# Patient Record
Sex: MALE | Race: WHITE | Employment: UNEMPLOYED | URBAN - METROPOLITAN AREA
[De-identification: names, ages, dates, MRNs, and addresses within clinical notes are randomized per-mention and may not be internally consistent; named-entity substitution may affect disease eponyms.]

---

## 2019-02-13 ENCOUNTER — HOSPITAL ENCOUNTER (OUTPATIENT)
Dept: PHYSICAL THERAPY | Age: 39
Discharge: HOME | End: 2019-02-13
Payer: COMMERCIAL

## 2019-02-13 PROCEDURE — 97162 PT EVAL MOD COMPLEX 30 MIN: CPT

## 2019-02-13 PROCEDURE — 97110 THERAPEUTIC EXERCISES: CPT

## 2019-02-13 NOTE — PROGRESS NOTES
PT DAILY TREATMENT NOTE/KNEE EVAL 10-18 Patient Name: Ida Solomon Date:2019 : 1980 [x]  Patient  Verified Payor: BLUE CROSS / Plan: [facility] PPO / Product Type: PPO / In time:1030 Out time:1130 Total Treatment Time (min): 60 Visit #: 1 of 24 Medicare/BCBS Only Total Timed Codes (min):  40 1:1 Treatment Time:  60  
 
Treatment Area: Pain in right knee [M25.561] Bilateral leg weakness [R29.898] SUBJECTIVE Pain Level (0-10 scale): 2  
[]constant [x]intermittent [x]improving []worsening []no change since onset Any medication changes, allergies to medications, adverse drug reactions, diagnosis change, or new procedure performed?: [x] No    [] Yes (see summary sheet for update) Subjective functional status/changes:    
PLOF: difficulty with prolonged walking and standing, trouble performing household and community ADLs. Limitations to PLOF: continued difficulty with walking/standing, Trouble with community and household ADLs PMHx/Surgical Hx: stroke 3 years ago Work Hx: works  For [employer] Pt Goals: walk without cane OBJECTIVE/EXAMINATION 
  
 
20 min [x]Eval                  []Re-Eval  
 
 
40 min Therapeutic Exercise:  [x] See flow sheet :  
Rationale: increase strength and improve coordination to improve the patients ability to increase ease with community ambulation With 
 [] TE 
 [] TA 
 [] neuro 
 [] other: Patient Education: [x] Review HEP [] Progressed/Changed HEP based on:  
[] positioning   [] body mechanics   [] transfers   [] heat/ice application   
[] other:   
 
 
 
Physical Therapy Evaluation - Knee Posture: [] Varus    [] Valgus    [x] Recurvatum ( on left) [] Tibial Torsion    [] Foot Supination    [] Foot Pronation Describe: 
 
Gait:  [] Normal    [x] Abnormal    [] Antalgic    [] NWB    Device: single point cane   Describe:decreased knee flexion during swing phase on right, hyper extension on left knee during stand phase, poor toe off and limited DF on left. ROM / Strength 
[] Unable to assess                 Strength                    AROM                PROM Left Right Left Right Left Right Hip Flexion 2- 5  wnl wnl Extension 2- 4  wnl wnl Abduction 1 4  wnl wnl Adduction 1 4  wnl wnl Knee Flexion 2- 5  wnl wnl Extension 2- 3+  wnl wnl Ankle Plantarflexion 1 1  wnl wnl Dorsiflexion 2- 5  wnl wnl Flexibility: [] Unable to assess at this time Hamstrings: (L) Tightness= [x] WNL   [] Min   [x] Mod   [] Severe (R) Tightness= [] WNL   [] Min   [x] Mod   [] Severe Quadriceps: (L) Tightness= [] WNL   [] Min   [x] Mod   [] Severe (R) Tightness= [] WNL   [] Min   [x] Mod   [] Severe Gastroc: (L) Tightness= [] WNL   [] Min   [x] Mod   [] Severe (R) Tightness= [] WNL   [] Min   [x] Mod   [] Severe Other: 
 
Palpation: right knee Neg/Pos  Neg/Pos  Neg/Pos Joint Line pos Quad tendon pos Patellar ligament pos Patella pos Fibular head neg Pes Anserinus neg Tibial tubercle neg Hamstring tendons neg Infrapatellar fat pad neg Optional Tests: 
  
Patellar Mobility: WNL B Bed mobility: (I) Ambulation: MOD (I) Sit to stand: MOD (I) Sitting balance: good Standing balance: fair Stair navigation: requires use of hand rail and ambulates with step two gait pattern. Required CGA-MIN A Other tests/comments: 
Romberg stance:  Requires hand held support, weight shift over to right LE noted. Five time sit to stand: 24 seconds, requires cues of hands, poor eccentric quad control. Tinietti:  13, high risk for falls SLS: able to hold 30 seconds on right, able to hold 10 seconds on left with hand held support Pain Level (0-10 scale) post treatment: 2 
 
ASSESSMENT/Changes in Function: Pt is a 33 yo male with complaints of right knee pain and left LE weakness.   Pt notes he received arthroscopic surgery  8 months ago to remove a cyst on the right knee. Pt reports  history of a CVA 3 years ago which affected his left leg and was being treated in PT, however stopped due to moving to [facility] 4 months ago. Notes that his left LE started to bother him again following the surgery. Notes that his left leg has been feeling very stiff/weak lately, and thinks it is from compensating for his right leg. Pt denies no pain in left leg, just weakness. Right knee pain is intermittent, achy, dull, with episodes of sharp pain with general knee motions. Notes pain increases with walking, prolonged standing and with prolonged sitting. Pt notes pain is eased with rest and heat. Pt currently ambulates with a single point cane and uses it for both household and community ambulation. Notes he has been using his cane since he had the stroke 3 years ago. Pt notes that he feels like his left leg limits him more than his right due to the weakness. Pt notes he experiences occasional falls with the last fall occurring last week while playing with children. Pt presents with S/S consistent with impaired balance post CVA. Upon examination pt demonstrates with fair standing balance, poor dynamic balance, impaired gait mechanics, left LE weakness and has a high risk for falls evident by scoring a 13/28 on the tinetti balance test.  Due to impairments listed above pt is having difficulty performing household/community ADLS and notes difficulty playing with children. Patient will continue to benefit from skilled PT services to modify and progress therapeutic interventions, address functional mobility deficits, address ROM deficits, address strength deficits, analyze and address soft tissue restrictions, analyze and cue movement patterns, analyze and modify body mechanics/ergonomics, assess and modify postural abnormalities and instruct in home and community integration to attain remaining goals. [x]  See Plan of Care []  See progress note/recertification 
[]  See Discharge Summary Progress towards goals / Updated goals: 
Short term goals to be achieved in 1 week: 
1) Pt will report full compliance with HEP in order to progress in therapy. At [facility]: handed out HEP Long term goals to be achieved in 8 weeks 1) Pt will have an improved tinetti score to >/= 24 indicating a low risk for falls to increase ease with outdoor ambulation At [facility]: tinetti=13 
2) Pt will have an increase in the five time sit to stand test to >/=15 seconds without use of hands to increase ease with household ADLs. At [facility]:  Five time sit to stand: 24 seconds, requires cues of hands, poor eccentric quad control. 3) Pt will demonstrate an increase in SLS to >/=10 seconds on left to increase ease with stair management. At [facility]: able to hold 2 seconds with hand held support 4) Pt will have an improved FOTO to >/= 53 to increase ease with function At eval: FOTO= 42 
5) Pt will report an increase in function of >/=70% to increase ease with playing with children At eval: 0% PLAN [x]  Upgrade activities as tolerated     [x]  Continue plan of care 
[]  Update interventions per flow sheet      
[]  Discharge due to:_ 
[]  Other:Ton Ashraf 2/13/2019  10:29 AM

## 2019-02-13 NOTE — PROGRESS NOTES
[facility] [address] [facility], [address] [place]  
[phone] fax Plan of Care/ Statement of Necessity for Physical Therapy Services Patient name: Fan Reich Start of Care: 2019 Referral source: Alicia Funes DO : 1980 Medical Diagnosis: Pain in right knee [M25.561] Bilateral leg weakness [R29.898] Payor: BLUE CROSS / Plan: [facility] PPO / Product Type: PPO /  Onset Date:CVA 3 years ago, S/P of right knee: 8 months ago Treatment Diagnosis: LE weakness/impaired balance Prior Hospitalization: see medical history Provider#: [id number] Medications: Verified on Patient summary List  
 Comorbidities:  stroke 3 years ago Prior Level of Function: difficulty with prolonged walking and standing, trouble performing household and community ADLs The Plan of Care and following information is based on the information from the initial evaluation. Assessment/ key information:  
Pt is a 33 yo male with complaints of right knee pain and left LE weakness. Pt notes he received arthroscopic surgery  8 months ago to remove a cyst on the right knee. Pt reports  history of a CVA 3 years ago which affected his left leg and was being treated in PT, however stopped due to moving to [facility] 4 months ago. Notes that his left LE started to bother him again following the surgery. Notes that his left leg has been feeling very stiff/weak lately, and thinks it is from compensating for his right leg. Pt denies no pain in left leg, just weakness. Right knee pain is intermittent, achy, dull, with episodes of sharp pain with general knee motions. Notes pain increases with walking, prolonged standing and with prolonged sitting. Pt notes pain is eased with rest and heat.  Pt currently ambulates with a single point cane and uses it for both household and community ambulation. Notes he has been using his cane since he had the stroke 3 years ago. Pt notes that he feels like his left leg limits him more than his right due to the weakness. Pt notes he experiences occasional falls with the last fall occurring last week while playing with children. Pt presents with S/S consistent with impaired balance post CVA. Upon examination pt demonstrates with fair standing balance, poor dynamic balance, impaired gait mechanics, left LE weakness and has a high risk for falls evident by scoring a 13/28 on the tinetti balance test.  Due to impairments listed above pt is having difficulty performing household/community ADLS and notes difficulty playing with children. 
  
Patient will continue to benefit from skilled PT services to modify and progress therapeutic interventions, address functional mobility deficits, address ROM deficits, address strength deficits, analyze and address soft tissue restrictions, analyze and cue movement patterns, analyze and modify body mechanics/ergonomics, assess and modify postural abnormalities and instruct in home and community integration to attain remaining goals. Physical Therapy Evaluation - Knee 
  
Posture:         [] Varus    [] Valgus    [x] Recurvatum ( on left) [] Tibial Torsion    [] Foot Supination    [] Foot Pronation Describe: 
  
Gait:                [] Normal    [x] Abnormal    [] Antalgic    [] NWB    Device: single point cane Describe:decreased knee flexion during swing phase on right, hyper extension on left knee during stand phase, poor toe off and limited DF on left. 
  
ROM / Strength 
[] Unable to assess                 Strength                    AROM                PROM 
    Left Right Left Right Left Right Hip Flexion 2- 5   wnl wnl    
  Extension 2- 4   wnl wnl    
  Abduction 1 4   wnl wnl    
  Adduction 1 4   wnl wnl    
 Knee Flexion 2- 5   wnl wnl    
  Extension 2- 3+   wnl wnl    
Ankle Plantarflexion 1 1   wnl wnl    
  Dorsiflexion 2- 5   wnl wnl    
  
  
Flexibility: [] Unable to assess at this time Hamstrings: (L) Tightness= [x] WNL   [] Min   [x] Mod   [] Severe (R) Tightness= [] WNL   [] Min   [x] Mod   [] Severe Quadriceps: (L) Tightness= [] WNL   [] Min   [x] Mod   [] Severe (R) Tightness= [] WNL   [] Min   [x] Mod   [] Severe Gastroc: (L) Tightness= [] WNL   [] Min   [x] Mod   [] Severe (R) Tightness= [] WNL   [] Min   [x] Mod   [] Severe Other: 
  
Palpation: right knee 
  Neg/Pos   Neg/Pos   Neg/Pos Joint Line pos Quad tendon pos Patellar ligament pos Patella pos Fibular head neg Pes Anserinus neg Tibial tubercle neg Hamstring tendons neg Infrapatellar fat pad neg  
  
Optional Tests: 
  
Patellar Mobility: WNL B Bed mobility: (I) Ambulation: MOD (I) Sit to stand: MOD (I) Sitting balance: good Standing balance: fair Stair navigation: requires use of hand rail and ambulates with step two gait pattern. Required CGA-MIN A 
                  
  
Other tests/comments: 
Romberg stance:  Requires hand held support, weight shift over to right LE noted. Five time sit to stand: 24 seconds, requires cues of hands, poor eccentric quad control. Tinietti:  13, high risk for falls SLS: able to hold 30 seconds on right, able to hold 10 seconds on left with hand held support 
  
             
 
 
 
Evaluation Complexity History MEDIUM  Complexity : 1-2 comorbidities / personal factors will impact the outcome/ POC ; Examination MEDIUM Complexity : 3 Standardized tests and measures addressing body structure, function, activity limitation and / or participation in recreation  ;Presentation MEDIUM Complexity : Evolving with changing characteristics  ; Clinical Decision Making MEDIUM Complexity : FOTO score of 26-74 Overall Complexity Rating: MEDIUM Problem List: pain affecting function, decrease ROM, decrease strength, impaired gait/ balance, decrease ADL/ functional abilitiies, decrease activity tolerance, decrease flexibility/ joint mobility and decrease transfer abilities Treatment Plan may include any combination of the following: Therapeutic exercise, Therapeutic activities, Neuromuscular re-education, Physical agent/modality, Gait/balance training, Manual therapy, Patient education, Self Care training, Functional mobility training, Home safety training and Stair training Patient / Family readiness to learn indicated by: asking questions Persons(s) to be included in education: patient (P) Barriers to Learning/Limitations: None Patient Goal (s): be able to walk without cane Patient Self Reported Health Status: fair Rehabilitation Potential: good Progress towards goals / Updated goals: 
Short term goals to be achieved in 1 week: 
1) Pt will report full compliance with HEP in order to progress in therapy. 
 
  
Long term goals to be achieved in 8 weeks 1) Pt will have an improved tinetti score to >/= 24 indicating a low risk for falls to increase ease with outdoor ambulation 2) Pt will have an increase in the five time sit to stand test to >/=15 seconds without use of hands to increase ease with household ADLs. 3) Pt will demonstrate an increase in SLS to >/=10 seconds on left to increase ease with stair management. 4) Pt will have an improved FOTO to >/= 53 to increase ease with function 5) Pt will report an increase in function of >/=70% to increase ease with playing with children Frequency / Duration: Patient to be seen 3 times per week for 8 weeks. Patient/ Caregiver education and instruction: Diagnosis, prognosis, activity modification and exercises 
 [x]  Plan of care has been reviewed with GORDON Whitmore 2/13/2019 12:17 PM 
 ________________________________________________________________________ I certify that the above Therapy Services are being furnished while the patient is under my care. I agree with the treatment plan and certify that this therapy is necessary. [de-identified] Signature:____________Date:_________TIME:________ 
 
[employer], Date and Time must be completed for valid certification ** Please sign and return to [facility] [address] [facility] [address] [address] [place]  
[phone] fax

## 2019-02-15 ENCOUNTER — HOSPITAL ENCOUNTER (OUTPATIENT)
Dept: PHYSICAL THERAPY | Age: 39
Discharge: HOME | End: 2019-02-15
Payer: COMMERCIAL

## 2019-02-15 PROCEDURE — 97112 NEUROMUSCULAR REEDUCATION: CPT

## 2019-02-15 NOTE — PROGRESS NOTES
PT DAILY TREATMENT NOTE 10-18 Patient Name: Michiel Curling Date:2/15/2019 : 1980 [x]  Patient  Verified Payor: BLUE CROSS / Plan: [facility] PPO / Product Type: PPO / In time:1030 Out time: 1130 Total Treatment Time (min): 60 Visit #: 2 of 24 Medicare/BCBS Only Total Timed Codes (min):  60 1:1 Treatment Time:  30 Treatment Area: Pain in right knee [M25.561] Bilateral leg weakness [R29.898] SUBJECTIVE Pain Level (0-10 scale): 0 Any medication changes, allergies to medications, adverse drug reactions, diagnosis change, or new procedure performed?: [x] No    [] Yes (see summary sheet for update) Subjective functional status/changes:   [] No changes reported Pt notes initiation of HEP OBJECTIVE 30 min Therapeutic Exercise:  [x] See flow sheet :  
Rationale: increase ROM and increase strength to improve the patients ability to increase ease with ADLs 30 min Neuromuscular Re-education:  [x]  See flow sheet :gait training, standing balance exercises Rationale: increase strength, improve balance and increase proprioception  to improve the patients ability to increase ease with community ambulation With 
 [] TE 
 [] TA 
 [] neuro 
 [] other: Patient Education: [x] Review HEP [] Progressed/Changed HEP based on:  
[] positioning   [] body mechanics   [] transfers   [] heat/ice application   
[] other:   
 
  
 
Pain Level (0-10 scale) post treatment: 0 
 
ASSESSMENT/Changes in Function: Initiated exercises per POC. Pt was able to ambulate 20 feet with CGA-MIN A . Pt presents with weakness of left quad and dorsiflexors evident by ambulating with an extension biased gait pattern. Pt demonstrated with decreased foot clearance during swing phase and locks left knee out during single limb stance on left. Continue to progress gait training per pt tolerance.   
 
Patient will continue to benefit from skilled PT services to modify and progress therapeutic interventions, address functional mobility deficits, address ROM deficits, address strength deficits, analyze and address soft tissue restrictions, analyze and cue movement patterns, analyze and modify body mechanics/ergonomics, assess and modify postural abnormalities, address imbalance/dizziness and instruct in home and community integration to attain remaining goals. [x]  See Plan of Care 
[]  See progress note/recertification 
[]  See Discharge Summary Progress towards goals / Updated goals: 
Short term goals to be achieved in 1 week: 
1) Pt will report full compliance with HEP in order to progress in therapy. At [facility]: handed out HEP 
  
Long term goals to be achieved in 8 weeks 1) Pt will have an improved tinetti score to >/= 24 indicating a low risk for falls to increase ease with outdoor ambulation At [facility]: tinetti=13 
2) Pt will have an increase in the five time sit to stand test to >/=15 seconds without use of hands to increase ease with household ADLs. At [facility]:  Five time sit to stand: 24 seconds, requires cues of hands, poor eccentric quad control. 3) Pt will demonstrate an increase in SLS to >/=10 seconds on left to increase ease with stair management. At [facility]: able to hold 2 seconds with hand held support 4) Pt will have an improved FOTO to >/= 53 to increase ease with function At [facility]: FOTO= 42 
5) Pt will report an increase in function of >/=70% to increase ease with playing with children At [facility]: 0% 
  
 
 
 
PLAN [x]  Upgrade activities as tolerated     [x]  Continue plan of care 
[]  Update interventions per flow sheet      
[]  Discharge due to:_ 
[]  Other:_ Kit Loredo 2/15/2019  10:32 AM 
 
Future Appointments Date Time Provider Daron Rojas 2/19/2019  1:30 PM Katie Eastman [facility]  
2/21/2019  9:00 AM Afshin Ponce PTA [facility]  
2/25/2019  9:30 AM Afshin Ponce PTA [facility]  
 2/27/2019 11:30 AM [facility]ula Gram [facility]  
3/1/2019  9:00 AM [facility]ula Gram [facility]  
3/5/2019 11:30 AM [facility]ula Gram [facility]  
3/6/2019 10:30 AM Bula Gram [facility]  
3/8/2019  9:30 AM [facility]ula Gram [facility]  
3/11/2019 10:30 AM Bula Gram [facility]  
3/13/2019  1:00 PM Bula Gram [facility]  
3/15/2019  9:30 AM Bula Gram [facility]  
3/18/2019 10:30 AM Bula Gram [facility]  
3/20/2019 10:30 AM Bula Gram [facility]  
3/22/2019  9:00 AM Natasha Ashraf [facility]

## 2019-02-19 ENCOUNTER — HOSPITAL ENCOUNTER (OUTPATIENT)
Dept: PHYSICAL THERAPY | Age: 39
Discharge: HOME | End: 2019-02-19
Payer: COMMERCIAL

## 2019-02-19 PROCEDURE — 97110 THERAPEUTIC EXERCISES: CPT

## 2019-02-19 PROCEDURE — 97112 NEUROMUSCULAR REEDUCATION: CPT

## 2019-02-19 NOTE — PROGRESS NOTES
PT DAILY TREATMENT NOTE 10-18 Patient Name: Anam Grace Date:2019 : 1980 [x]  Patient  Verified Payor: BLUE CROSS / Plan: [facility] PPO / Product Type: PPO / In time:133  Out time:230 Total Treatment Time (min): 57 Visit #: 2 of 24 Medicare/BCBS Only Total Timed Codes (min):  57 1:1 Treatment Time:  62 Treatment Area: Pain in right knee [M25.561] Bilateral leg weakness [R29.898] SUBJECTIVE Pain Level (0-10 scale): 0 Any medication changes, allergies to medications, adverse drug reactions, diagnosis change, or new procedure performed?: [x] No    [] Yes (see summary sheet for update) Subjective functional status/changes:   [] No changes reported Pt notes mild soreness following last treatment session OBJECTIVE 20 min Therapeutic Exercise:  [x] See flow sheet :  
Rationale: increase ROM and increase strength to improve the patients ability to increase ease with ADls 37 min Neuromuscular Re-education:  [x]  See flow sheet :dynamic gait and balance exercises Rationale: increase strength, improve coordination and increase proprioception  to improve the patients ability to increase ease with community ambulation With 
 [] TE 
 [] TA 
 [] neuro 
 [] other: Patient Education: [x] Review HEP [] Progressed/Changed HEP based on:  
[] positioning   [] body mechanics   [] transfers   [] heat/ice application   
[] other:   
 
  
 
Pain Level (0-10 scale) post treatment: 0 
 
ASSESSMENT/Changes in Function: Pt presents with improved static balance. Progressed pt to Romberg stance on airex. Pt continues to require CGA-MIN A with ambulation without AD. Patient will continue to benefit from skilled PT services to modify and progress therapeutic interventions, address functional mobility deficits and address ROM deficits to attain remaining goals. [x]  See Plan of Care 
[]  See progress note/recertification 
[]  See Discharge Summary Progress towards goals / Updated goals: 
Short term goals to be achieved in 1 week: 
1) Pt will report full compliance with HEP in order to progress in therapy. At [facility]: handed out HEP Current: Met: reports full compliance with HEP. 2/19/19 
  
Long term goals to be achieved in 8 weeks 1) Pt will have an improved tinetti score to >/= 24 indicating a low risk for falls to increase ease with outdoor ambulation At [facility]: tinetti=13 
2) Pt will have an increase in the five time sit to stand test to >/=15 seconds without use of hands to increase ease with household ADLs. At [facility]:  Five time sit to stand: 24 seconds, requires cues of hands, poor eccentric quad control. 3) Pt will demonstrate an increase in SLS to >/=10 seconds on left to increase ease with stair management. At [facility]: able to hold 2 seconds with hand held support 4) Pt will have an improved FOTO to >/= 53 to increase ease with function At [facility]: FOTO= 42 
5) Pt will report an increase in function of >/=70% to increase ease with playing with children At [facility]: 0% PLAN [x]  Upgrade activities as tolerated     [x]  Continue plan of care 
[]  Update interventions per flow sheet      
[]  Discharge due to:_ 
[]  Other:_ Yeni Wray 2/19/2019  1:34 PM 
 
Future Appointments Date Time Provider Daron Rojas 2/21/2019  9:00 AM Gerry Prasad PTA [facility]  
2/25/2019  9:30 AM Gerry Prasad PTA [facility]  
2/27/2019 11:30 AM [facility]  
3/1/2019  9:00 AM [facility]en [facility]  
3/5/2019 11:30 AM [facility]en [facility]  
3/6/2019 10:30 AM [facility]  
3/8/2019  9:30 AM Kirsten Acuna [facility]  
3/11/2019 10:30 AM Kirsten Acuna [facility]  
3/13/2019  1:00 PM Kirsten Acuna [facility]  
3/15/2019  9:30 AM Kirsten Acuna [facility]  
3/18/2019 10:30 AM Kirsten Acuna [facility]  
3/20/2019 10:30 AM Kirsten Acuna [facility]  
3/22/2019  9:00 AM Natasha Ashraf [facility]

## 2019-02-21 ENCOUNTER — HOSPITAL ENCOUNTER (OUTPATIENT)
Dept: PHYSICAL THERAPY | Age: 39
Discharge: HOME | End: 2019-02-21
Payer: COMMERCIAL

## 2019-02-21 PROCEDURE — 97112 NEUROMUSCULAR REEDUCATION: CPT

## 2019-02-21 PROCEDURE — 97110 THERAPEUTIC EXERCISES: CPT

## 2019-02-21 NOTE — PROGRESS NOTES
PT DAILY TREATMENT NOTE 10-18 Patient Name: Fan Reich Date:2019 : 1980 [x]  Patient  Verified Payor: BLUE CROSS / Plan: [facility] PPO / Product Type: PPO / In time:9:02  Out time:10:04 Total Treatment Time (min): 62 Visit #: 4 of 24 Medicare/BCBS Only Total Timed Codes (min):  62 1:1 Treatment Time:  58 Treatment Area: Pain in right knee [M25.561] Bilateral leg weakness [R29.898] SUBJECTIVE Pain Level (0-10 scale): 0 Any medication changes, allergies to medications, adverse drug reactions, diagnosis change, or new procedure performed?: [x] No    [] Yes (see summary sheet for update) Subjective functional status/changes:   [] No changes reported Pt reports that he walks a little without his cane at home, but only while holding on to the walls. OBJECTIVE Min Type Additional Details  
 [] Estim:  []Unatt       []IFC  []Premod []Other:  []w/ice   []w/heat Position: Location:  
 [] Estim: []Att    []TENS instruct  []NMES []Other:  []w/US   []w/ice   []w/heat Position: Location:  
 []  Traction: [] Cervical       []Lumbar 
                     [] Prone          []Supine []Intermittent   []Continuous Lbs: 
[] before manual 
[] after manual  
 []  Ultrasound: []Continuous   [] Pulsed []1MHz   []3MHz W/cm2: 
Location:  
 []  Iontophoresis with dexamethasone Location: [] Take home patch  
[] In clinic  
 []  Ice     []  heat 
[]  Ice massage 
[]  Laser  
[]  Anodyne Position: Location:  
 []  Laser with stim 
[]  Other:  Position: Location:  
 []  Vasopneumatic Device Pressure:       [] lo [] med [] hi  
Temperature: [] lo [] med [] hi  
[] Skin assessment post-treatment:  []intact []redness- no adverse reaction 
  []redness  adverse reaction:  
 
 
39 min Therapeutic Exercise:  [x] See flow sheet :  
 Rationale: increase ROM and increase strength to improve the patients ability to increase ease with ADLs. 25 min Neuromuscular Re-education:  [x]  See flow sheet :balance and gait training. Rationale: increase ROM, increase strength, improve balance and increase proprioception  to improve the patients ability to increase tolerance to activities. With 
 [] TE 
 [] TA 
 [] neuro 
 [] other: Patient Education: [x] Review HEP [] Progressed/Changed HEP based on:  
[] positioning   [] body mechanics   [] transfers   [] heat/ice application   
[] other:   
 
Other Objective/Functional Measures:   
 
Pain Level (0-10 scale) post treatment: 0 
 
ASSESSMENT/Changes in Function: Pt ambulates with quad thrust on left LE and poor trunk stability. With standing marching on left LE pt compensates with Left QL to perform hip hikes. Pt has no quad set on left LE. Patient will continue to benefit from skilled PT services to modify and progress therapeutic interventions, address functional mobility deficits, address ROM deficits, address strength deficits and address imbalance/dizziness to attain remaining goals. []  See Plan of Care 
[]  See progress note/recertification 
[]  See Discharge Summary Progress towards goals / Updated goals: 
Short term goals to be achieved in 1 week: 
1) Pt will report full compliance with HEP in order to progress in therapy. At [facility]: handed out HEP Current: Met: reports full compliance with HEP. 2/19/19 
  
Long term goals to be achieved in 8 weeks 1) Pt will have an improved tinetti score to >/= 24 indicating a low risk for falls to increase ease with outdoor ambulation At [facility]: tinetti=13 
2) Pt will have an increase in the five time sit to stand test to >/=15 seconds without use of hands to increase ease with household ADLs. At [facility]:  Five time sit to stand: 24 seconds, requires cues of hands, poor eccentric quad control. Current: Not met: 5x sit to stand from 18\" chair with B UE use = 32 sec. 2/21/19 3) Pt will demonstrate an increase in SLS to >/=10 seconds on left to increase ease with stair management. At eval: able to hold 2 seconds with hand held support 4) Pt will have an improved FOTO to >/= 53 to increase ease with function At [facility]: FOTO= 42 
5) Pt will report an increase in function of >/=70% to increase ease with playing with children At eval: 0% 
  
  
 
 
 
PLAN 
[]  Upgrade activities as tolerated     [x]  Continue plan of care 
[]  Update interventions per flow sheet      
[]  Discharge due to:_ 
[]  Other:_ Tae Solis PTA 2/21/2019  9:06 AM 
 
Future Appointments Date Time Provider Daron Rojas 2/25/2019  9:30 AM Rusty Abreu PTA [facility]  
2/27/2019 11:30 AM Leanna Strong [facility]  
3/1/2019  9:00 AM Leanna Strong [facility]  
3/5/2019 11:30 AM Leanna Strong [facility]  
3/6/2019 10:30 AM Leanna Strong [facility]  
3/8/2019  9:30 AM Leanna Strong [facility]  
3/11/2019 10:30 AM Leanna Strong [facility]  
3/13/2019  1:00 PM Leanna Strong [facility]  
3/15/2019  9:30 AM Leanna Strong [facility]  
3/18/2019 10:30 AM Leanna Strong [facility]  
3/20/2019 10:30 AM Leanna Strong [facility]  
3/22/2019  9:00 AM Natasha Ashraf [facility]

## 2019-02-25 ENCOUNTER — HOSPITAL ENCOUNTER (OUTPATIENT)
Dept: PHYSICAL THERAPY | Age: 39
Discharge: HOME | End: 2019-02-25
Payer: COMMERCIAL

## 2019-02-25 PROCEDURE — 97110 THERAPEUTIC EXERCISES: CPT

## 2019-02-25 PROCEDURE — 97112 NEUROMUSCULAR REEDUCATION: CPT

## 2019-02-27 ENCOUNTER — HOSPITAL ENCOUNTER (OUTPATIENT)
Dept: PHYSICAL THERAPY | Age: 39
Discharge: HOME | End: 2019-02-27
Payer: COMMERCIAL

## 2019-02-27 PROCEDURE — 97112 NEUROMUSCULAR REEDUCATION: CPT

## 2019-02-27 PROCEDURE — 97110 THERAPEUTIC EXERCISES: CPT

## 2019-02-27 NOTE — PROGRESS NOTES
PT DAILY TREATMENT NOTE 10-18 Patient Name: Carmina Glasgow Date:2019 : 1980 [x]  Patient  Verified Payor: BLUE CROSS / Plan: [facility] PPO / Product Type: PPO / In time:1131  Out time: 1250 Total Treatment Time (min): 79 Visit #: 6 of 24 Medicare/BCBS Only Total Timed Codes (min):  71 1:1 Treatment Time:  30 Treatment Area: Pain in right knee [M25.561] Bilateral leg weakness [R29.898] SUBJECTIVE Pain Level (0-10 scale): 0 Any medication changes, allergies to medications, adverse drug reactions, diagnosis change, or new procedure performed?: [x] No    [] Yes (see summary sheet for update) Subjective functional status/changes:   [] No changes reported Pt notes improved balance and feels more steady when ambulating with in the home OBJECTIVE Modality rationale: decrease inflammation and decrease pain to improve the patients ability to increase ease with ADLS Min Type Additional Details  
 [] Estim:  []Unatt       []IFC  []Premod []Other:  []w/ice   []w/heat Position: Location:  
 [] Estim: []Att    []TENS instruct  []NMES []Other:  []w/US   []w/ice   []w/heat Position: Location:  
 []  Traction: [] Cervical       []Lumbar 
                     [] Prone          []Supine []Intermittent   []Continuous Lbs: 
[] before manual 
[] after manual  
 []  Ultrasound: []Continuous   [] Pulsed []1MHz   []3MHz W/cm2: 
Location:  
 []  Iontophoresis with dexamethasone Location: [] Take home patch  
[] In clinic  
10 [x]  Ice     []  heat 
[]  Ice massage 
[]  Laser  
[]  Anodyne Position:sitting Location:right knee  
 []  Laser with stim 
[]  Other:  Position: Location:  
 []  Vasopneumatic Device Pressure:       [] lo [] med [] hi  
Temperature: [] lo [] med [] hi  
[] Skin assessment post-treatment:  []intact []redness- no adverse reaction []redness  adverse reaction:  
 
 
31 min Therapeutic Exercise:  [x] See flow sheet :  
Rationale: increase ROM and increase strength to improve the patients ability to increase ease with self care tasks 40 min Neuromuscular Re-education:  [x]  See flow sheet :dynamic gait and balance exercises Rationale: increase strength, improve coordination, improve balance and increase proprioception  to improve the patients ability to increase ease with ambulation With 
 [] TE 
 [] TA 
 [] neuro 
 [] other: Patient Education: [x] Review HEP [] Progressed/Changed HEP based on:  
[] positioning   [] body mechanics   [] transfers   [] heat/ice application   
[] other:   
 
 
Pain Level (0-10 scale) post treatment: 0 
 
ASSESSMENT/Changes in Function: Pt continues to demonstrate improved balance with standing balance and dynamic gait exercises. Pt requires CGA with ambulation with MIN A while making turns. Patient will continue to benefit from skilled PT services to modify and progress therapeutic interventions, address functional mobility deficits, address ROM deficits, address strength deficits, analyze and address soft tissue restrictions, analyze and cue movement patterns, analyze and modify body mechanics/ergonomics, assess and modify postural abnormalities, address imbalance/dizziness and instruct in home and community integration to attain remaining goals. [x]  See Plan of Care 
[]  See progress note/recertification 
[]  See Discharge Summary Progress towards goals / Updated goals: 
Short term goals to be achieved in 1 week: 
1) Pt will report full compliance with HEP in order to progress in therapy. At [facility]: handed out HEP Current: Met: reports full compliance with HEP. 2/19/19 
  
Long term goals to be achieved in 8 weeks 1) Pt will have an improved tinetti score to >/= 24 indicating a low risk for falls to increase ease with outdoor ambulation At [facility]: tinetti=13 2) Pt will have an increase in the five time sit to stand test to >/=15 seconds without use of hands to increase ease with household ADLs. At [facility]:  Five time sit to stand: 24 seconds, requires cues of hands, poor eccentric quad control. Current: Not met: 5x sit to stand from 18\" chair with B UE use = 32 sec. 2/21/19 3) Pt will demonstrate an increase in SLS to >/=10 seconds on left to increase ease with stair management. At [facility]: able to hold 2 seconds with hand held support Current: Progressing: Pt was able to perform alternating marching without HHA. 2/25/18 4) Pt will have an improved FOTO to >/= 53 to increase ease with function At [facility]: FOTO= 42 
5) Pt will report an increase in function of >/=70% to increase ease with playing with children At [facility]: 0% 
 
  
  
 
PLAN [x]  Upgrade activities as tolerated     [x]  Continue plan of care 
[]  Update interventions per flow sheet      
[]  Discharge due to:_ 
[]  Other:_ Amrita Phillips 2/27/2019  11:36 AM 
 
Future Appointments Date Time Provider Daron Rojas 3/1/2019  9:00 AM Juli Henok [facility]  
3/5/2019 11:30 AM Juli Henok [facility]  
3/6/2019 10:30 AM Juli Henok [facility]  
3/8/2019  9:30 AM Juli Henok [facility]  
3/11/2019 10:30 AM Juli Henok [facility]  
3/13/2019  1:00 PM Juli Henok [facility]  
3/15/2019  9:30 AM Juli Henok [facility]  
3/18/2019 10:30 AM Juli Henok [facility]  
3/20/2019 10:30 AM Juli Sánchezke [facility]  
3/22/2019  9:00 AM Natasha Ashraf [facility]

## 2019-03-01 ENCOUNTER — HOSPITAL ENCOUNTER (OUTPATIENT)
Dept: PHYSICAL THERAPY | Age: 39
Discharge: HOME | End: 2019-03-01
Payer: COMMERCIAL

## 2019-03-01 PROCEDURE — 97112 NEUROMUSCULAR REEDUCATION: CPT

## 2019-03-05 ENCOUNTER — HOSPITAL ENCOUNTER (OUTPATIENT)
Dept: PHYSICAL THERAPY | Age: 39
Discharge: HOME | End: 2019-03-05
Payer: COMMERCIAL

## 2019-03-05 PROCEDURE — 97112 NEUROMUSCULAR REEDUCATION: CPT

## 2019-03-05 NOTE — PROGRESS NOTES
PT DAILY TREATMENT NOTE 10-18    Patient Name: Natalie Albright  Date:3/5/2019  : 1980  [x]  Patient  Verified  Payor: BLUE CROSS / Plan: [facility] PPO / Product Type: PPO /    In time:1128  Out time: 8439  Total Treatment Time (min): 52  Visit #: 8 of 24    Medicare/BCBS Only   Total Timed Codes (min):  52 1:1 Treatment Time:  30       Treatment Area: Pain in right knee [M25.561]  Bilateral leg weakness [R29.898]    SUBJECTIVE  Pain Level (0-10 scale): 0  Any medication changes, allergies to medications, adverse drug reactions, diagnosis change, or new procedure performed?: [x] No    [] Yes (see summary sheet for update)  Subjective functional status/changes:   [] No changes reported  Pt has no new complaints at this time. OBJECTIVE        25 min Therapeutic Exercise:  [x] See flow sheet :   Rationale: increase ROM and increase strength to improve the patients ability to increase ease with self care tasks     27 min Neuromuscular Re-education:  [x]  See flow sheet : dynamic gait and balance exercises    Rationale: improve coordination, improve balance and increase proprioception  to improve the patients ability to increase ease with community ambulation          With   [] TE   [] TA   [] neuro   [] other: Patient Education: [x] Review HEP    [] Progressed/Changed HEP based on:   [] positioning   [] body mechanics   [] transfers   [] heat/ice application    [] other:         Pain Level (0-10 scale) post treatment: 0    ASSESSMENT/Changes in Function: Pt was able to ambulate 120 feet with CGA. Pt continues to present with improved LE strength and balance. Initiate side stepping next visit.     Patient will continue to benefit from skilled PT services to modify and progress therapeutic interventions, address functional mobility deficits, address ROM deficits, address strength deficits, analyze and address soft tissue restrictions, analyze and cue movement patterns, analyze and modify body mechanics/ergonomics, assess and modify postural abnormalities, address imbalance/dizziness and instruct in home and community integration to attain remaining goals. [x]  See Plan of Care  []  See progress note/recertification  []  See Discharge Summary         Progress towards goals / Updated goals:  Short term goals to be achieved in 1 week:  1) Pt will report full compliance with HEP in order to progress in therapy. At [facility]: handed out HEP  Current: Met: reports full compliance with HEP. 2/19/19     Long term goals to be achieved in 8 weeks  1) Pt will have an improved tinetti score to >/= 24 indicating a low risk for falls to increase ease with outdoor ambulation  At [facility]: tinetti=13  2) Pt will have an increase in the five time sit to stand test to >/=15 seconds without use of hands to increase ease with household ADLs. At [facility]:  Five time sit to stand: 24 seconds, requires cues of hands, poor eccentric quad control. Current: Not met: 5x sit to stand from 18\" chair with B UE use = 32 sec. 2/21/19  3) Pt will demonstrate an increase in SLS to >/=10 seconds on left to increase ease with stair management. At [facility]: able to hold 2 seconds with hand held support   Current: Progressing: Pt was able to perform alternating marching without HHA.  2/25/18  4) Pt will have an improved FOTO to >/= 53 to increase ease with function  At [facility]: FOTO= 42  5) Pt will report an increase in function of >/=70% to increase ease with playing with children  At [facility]: 0%  Current; MET, pt reports 80% SOC. 3/5/19           PLAN  [x]  Upgrade activities as tolerated     [x]  Continue plan of care  []  Update interventions per flow sheet       []  Discharge due to:_  []  Other:_      Marclea Amtrev 3/5/2019  11:29 AM    Future Appointments   Date Time Provider Daron Rojas   3/5/2019 11:30 AM Sam Racer [facility]   3/6/2019 10:30 AM Sam Racer [facility]   3/8/2019  9:30 AM Sam Racer [facility]   3/11/2019 10:30 AM Blair Perkins [facility]   3/13/2019  1:00 PM Blair Perkins [facility]   3/15/2019  9:30 AM Blair Perkins [facility]   3/18/2019 10:30 AM Blair Perkins [facility]   3/20/2019 10:30 AM Blair Perkins [facility]   3/22/2019  9:00 AM Blair Perkins [facility]

## 2019-03-06 ENCOUNTER — APPOINTMENT (OUTPATIENT)
Dept: PHYSICAL THERAPY | Age: 39
End: 2019-03-06
Payer: COMMERCIAL

## 2019-03-08 ENCOUNTER — APPOINTMENT (OUTPATIENT)
Dept: PHYSICAL THERAPY | Age: 39
End: 2019-03-08
Payer: COMMERCIAL

## 2019-03-11 ENCOUNTER — HOSPITAL ENCOUNTER (OUTPATIENT)
Dept: PHYSICAL THERAPY | Age: 39
Discharge: HOME | End: 2019-03-11
Payer: COMMERCIAL

## 2019-03-11 PROCEDURE — 97112 NEUROMUSCULAR REEDUCATION: CPT

## 2019-03-11 PROCEDURE — 97110 THERAPEUTIC EXERCISES: CPT

## 2019-03-11 NOTE — PROGRESS NOTES
PT DAILY TREATMENT NOTE 10-18    Patient Name: Natalie Albright  Date:3/11/2019  : 1980  [x]  Patient  Verified  Payor: BLUE CROSS / Plan: Iv[employer] [id number] PPO / Product Type: PPO /    In time:1030  Out time:1130  Total Treatment Time (min): 60  Visit #: 9 of 14    Medicare/BCBS Only   Total Timed Codes (min):  60 1:1 Treatment Time:  60       Treatment Area: Pain in right knee [M25.561]  Bilateral leg weakness [R29.898]    SUBJECTIVE  Pain Level (0-10 scale): 0  Any medication changes, allergies to medications, adverse drug reactions, diagnosis change, or new procedure performed?: [x] No    [] Yes (see summary sheet for update)  Subjective functional status/changes:   [] No changes reported  Pt has no reports of pain today. Notes he feels safer when walking    OBJECTIVE      20 min Therapeutic Exercise:  [x] See flow sheet :review of pt's goals and FOTO   Rationale: increase ROM and increase strength to improve the patients ability to increase ease with self care tasks     40 min Neuromuscular Re-education:  [x]  See flow sheet : dynamic gait exercises, standing balance exercises, review of tinettie test   Rationale: increase strength, improve coordination, improve balance and increase proprioception  to improve the patients ability to increase ease with ambulatin           With   [] TE   [] TA   [] neuro   [] other: Patient Education: [x] Review HEP    [] Progressed/Changed HEP based on:   [] positioning   [] body mechanics   [] transfers   [] heat/ice application    [] other:           Pain Level (0-10 scale) post treatment: 0    ASSESSMENT/Changes in Function: Pt is progressing well towards long term goals.  Pt is able to ambulate longer distances with CGA, demonstrates with improved standing balance and had an improved score in the five time sit to stand and tinetti test. Pt continues to present with fair dynamic standing balance and continues to require MIN A when making turns during ambulation. Patient will continue to benefit from skilled PT services to modify and progress therapeutic interventions, address functional mobility deficits, address ROM deficits, address strength deficits, analyze and address soft tissue restrictions, analyze and cue movement patterns, analyze and modify body mechanics/ergonomics, assess and modify postural abnormalities, address imbalance/dizziness and instruct in home and community integration to attain remaining goals. [x]  See Plan of Care  []  See progress note/recertification  []  See Discharge Summary         Progress towards goals / Updated goals:  Short term goals to be achieved in 1 week:  1) Pt will report full compliance with HEP in order to progress in therapy. At [facility]: handed out HEP  Current: Met: reports full compliance with HEP. 2/19/19     Long term goals to be achieved in 8 weeks  1) Pt will have an improved tinetti score to >/= 24 indicating a low risk for falls to increase ease with outdoor ambulation  At [facility]: tinetti=13  Current:Progressing: tinetti=17 3/11/19  2) Pt will have an increase in the five time sit to stand test to >/=15 seconds without use of hands to increase ease with household ADLs. At [facility]:  Five time sit to stand: 24 seconds, requires cues of hands, poor eccentric quad control. Current: Progressing: five time sit to stand from 18'' table height, uses B hands = 20 seconds. 3/11/19  3) Pt will demonstrate an increase in SLS to >/=10 seconds on left to increase ease with stair management. At [facility]: able to hold 2 seconds with hand held support   Current: able to right side: 30 seconds, left side 3 seconds, able to march without [facility] support 3/11/19  4) Pt will have an improved FOTO to >/= 53 to increase ease with function  At [facility]: FOTO= 42  Current;FOTO= 53 3/11/19  5) Pt will report an increase in function of >/=70% to increase ease with playing with children  At [facility]: 0%  Current; MET, pt reports 80% SOC. 3/5/19    Updated goals  1) Pt will be able to ambulate up to 250 feet with SBA without cane to increase (I) with household ambulation  Current: 100 feet with CGA                        PLAN  [x]  Upgrade activities as tolerated     [x]  Continue plan of care  []  Update interventions per flow sheet       []  Discharge due to:_  []  Other:_      Radha Gene 3/11/2019  10:34 AM    Future Appointments   Date Time Provider Daron Rojas   3/14/2019 11:30 AM Bula Gram [facility]   3/15/2019  9:30 AM Bula Gram [facility]   3/18/2019 10:30 AM Bula Gram [facility]   3/20/2019 10:30 AM Bula Gram [facility]   3/22/2019  9:00 AM Bula Gram [facility]

## 2019-03-11 NOTE — PROGRESS NOTES
[facility]              [address]        [place], [address] [place]   [phone] fax    Progress Note  Patient name: Keith Lorenzo Start of Care: 2019   Referral source: Carmel Figueroa DO : 1980   Medical/Treatment Diagnosis: Pain in right knee [M25.561]  Bilateral leg weakness [R29.898]  Payor: BLUE CROSS / Plan: [facility] PPO / Product Type: PPO /  Onset Date:CVA 3 years ago, S/P of right knee: 8 months ago     Prior Hospitalization: see medical history Provider#: [id number]   Medications: Verified on Patient Summary List    Comorbidities:   stroke 3 years ago  Prior Level of Function:difficulty with prolonged walking and standing, trouble performing household and community ADLs  Visits from Start of Care: 9    Missed Visits: 0    Established Goals:          Short term goals to be achieved in 1 week:  1) Pt will report full compliance with HEP in order to progress in therapy. At eval: handed out HEP  Current: Met: reports full compliance with HEP.      Long term goals to be achieved in 8 weeks  1) Pt will have an improved tinetti score to >/= 24 indicating a low risk for falls to increase ease with outdoor ambulation  At eval: tinetti=13  Current:Progressing: tinetti=17   2) Pt will have an increase in the five time sit to stand test to >/=15 seconds without use of hands to increase ease with household ADLs. At eval:  Five time sit to stand: 24 seconds, requires cues of hands, poor eccentric quad control. Current: Progressing: five time sit to stand from 18'' table height, uses B hands = 20 seconds. 3) Pt will demonstrate an increase in SLS to >/=10 seconds on left to increase ease with stair management.   At eval: able to hold 2 seconds with hand held support   Current: able to right side: 30 seconds, left side 3 seconds, able to march without HH support   4) Pt will have an improved FOTO to >/= 53 to increase ease with function  At eval: FOTO= 42  Current;FOTO= 53   5) Pt will report an increase in function of >/=70% to increase ease with playing with children  At eval: 0%  Current; MET, pt reports 80% SOC. Key Functional Changes:   See above goals    Updated Goals: to be achieved in 4 weeks:  1) Pt will be able to ambulate up to 250 feet with SBA without cane to increase (I) with household ambulation  Current: 100 feet with CGA       ASSESSMENT/RECOMMENDATIONS:  [x]Continue therapy per initial plan/protocol at a frequency of  2  x per week for 4 weeks  []Continue therapy with the following recommended changes:_____________________      _____________________________________________________________________  []Discontinue therapy progressing towards or have reached established goals  []Discontinue therapy due to lack of appreciable progress towards goals  []Discontinue therapy due to lack of attendance or compliance  []Await Physician's recommendations/decisions regarding therapy  []Other:________________________________________________________________    Thank you for this referral.    Davy Libman 3/11/2019 11:12 AM  NOTE TO PHYSICIAN:  PLEASE COMPLETE THE ORDERS BELOW AND   FAX TO [facility]: [phone]  If you are unable to process this request in 24 hours please contact our office: [phone]    []  I have read the above report and request that my patient continue as recommended. []  I have read the above report and request that my patient continue therapy with the following changes/special instructions:________________________________________  []I have read the above report and request that my patient be discharged from therapy.     [de-identified] Signature:____________Date:_________TIME:________    [facility], Date and Time must be completed for valid certification **

## 2019-03-13 ENCOUNTER — APPOINTMENT (OUTPATIENT)
Dept: PHYSICAL THERAPY | Age: 39
End: 2019-03-13
Payer: COMMERCIAL

## 2019-03-14 ENCOUNTER — HOSPITAL ENCOUNTER (OUTPATIENT)
Dept: PHYSICAL THERAPY | Age: 39
Discharge: HOME | End: 2019-03-14
Payer: COMMERCIAL

## 2019-03-14 PROCEDURE — 97112 NEUROMUSCULAR REEDUCATION: CPT

## 2019-03-14 PROCEDURE — 97110 THERAPEUTIC EXERCISES: CPT

## 2019-03-14 NOTE — PROGRESS NOTES
PT DAILY TREATMENT NOTE 10-18    Patient Name: Fan Reich  Date:3/14/2019  : 1980  [x]  Patient  Verified  Payor: BLUE CROSS / Plan: [facility] PPO / Product Type: PPO /    In time:1133  Out time:1240  Total Treatment Time (min): 79  Visit #: 10 of 14    Medicare/BCBS Only   Total Timed Codes (min):  57 1:1 Treatment Time:  62       Treatment Area: Pain in right knee [M25.561]  Bilateral leg weakness [R29.898]    SUBJECTIVE  Pain Level (0-10 scale): 0  Any medication changes, allergies to medications, adverse drug reactions, diagnosis change, or new procedure performed?: [x] No    [] Yes (see summary sheet for update)  Subjective functional status/changes:   [] No changes reported  Pt has no new complaints at this time    OBJECTIVE    Modality rationale: decrease edema, decrease inflammation and decrease pain to improve the patients ability to increase ease with ADLs   Min Type Additional Details    [] Estim:  []Unatt       []IFC  []Premod                        []Other:  []w/ice   []w/heat  Position:  Location:    [] Estim: []Att    []TENS instruct  []NMES                    []Other:  []w/US   []w/ice   []w/heat  Position:  Location:    []  Traction: [] Cervical       []Lumbar                       [] Prone          []Supine                       []Intermittent   []Continuous Lbs:  [] before manual  [] after manual    []  Ultrasound: []Continuous   [] Pulsed                           []1MHz   []3MHz W/cm2:  Location:    []  Iontophoresis with dexamethasone         Location: [] Take home patch   [] In clinic   10 [x]  Ice     []  heat  []  Ice massage  []  Laser   []  Anodyne Position:supine  Location:B knees    []  Laser with stim  []  Other:  Position:  Location:    []  Vasopneumatic Device Pressure:       [] lo [] med [] hi   Temperature: [] lo [] med [] hi   [] Skin assessment post-treatment:  []intact []redness- no adverse reaction    []redness  adverse reaction:       20 min Therapeutic Exercise:  [x] See flow sheet :   Rationale: increase ROM and increase strength to improve the patients ability to increase ease with self care tasks     37 min Neuromuscular Re-education:  [x]  See flow sheet :dynamic gait exercises, standing balance exercises    Rationale: increase strength, improve coordination, improve balance and increase proprioception  to improve the patients ability to increase ease with community ambulation            With   [] TE   [] TA   [] neuro   [] other: Patient Education: [x] Review HEP    [] Progressed/Changed HEP based on:   [] positioning   [] body mechanics   [] transfers   [] heat/ice application    [] other:         Pain Level (0-10 scale) post treatment: 0    ASSESSMENT/Changes in Function:  Pt is able to ambulate 100 feet with SBA-CGA. Initiated side stepping and lateral step us today. Pt notes increased muscle fatigue following today's session. Patient will continue to benefit from skilled PT services to modify and progress therapeutic interventions, address functional mobility deficits, address ROM deficits, address strength deficits, analyze and address soft tissue restrictions, analyze and cue movement patterns, analyze and modify body mechanics/ergonomics, assess and modify postural abnormalities, address imbalance/dizziness and instruct in home and community integration to attain remaining goals. [x]  See Plan of Care  []  See progress note/recertification  []  See Discharge Summary         Progress towards goals / Updated goals:  Short term goals to be achieved in 1 week:  1) Pt will report full compliance with HEP in order to progress in therapy.   At [facility]: handed out HEP  Current: Met: reports full compliance with HEP. 2/19/19     Long term goals to be achieved in 8 weeks  1) Pt will have an improved tinetti score to >/= 24 indicating a low risk for falls to increase ease with outdoor ambulation  At [facility]: tinetti=13  Current:Progressing: tinetti=17 3/11/19  2) Pt will have an increase in the five time sit to stand test to >/=15 seconds without use of hands to increase ease with household ADLs. At [facility]:  Five time sit to stand: 24 seconds, requires cues of hands, poor eccentric quad control. Current: Progressing: five time sit to stand from 18'' table height, uses B hands = 20 seconds. 3/11/19  3) Pt will demonstrate an increase in SLS to >/=10 seconds on left to increase ease with stair management.   At [facility]: able to hold 2 seconds with hand held support   Current: able to right side: 30 seconds, left side 3 seconds, able to march without HH support 3/11/19  4) Pt will have an improved FOTO to >/= 53 to increase ease with function  At [facility]: FOTO= 42  Current;FOTO= 53 3/11/19  5) Pt will report an increase in function of >/=70% to increase ease with playing with children  At [facility]: 0%  Current; MET, pt reports 80% SOC. 3/5/19     Updated goals  1) Pt will be able to ambulate up to 250 feet with SBA without cane to increase (I) with household ambulation  Current: 100 feet with CGA      PLAN  [x]  Upgrade activities as tolerated     [x]  Continue plan of care  []  Update interventions per flow sheet       []  Discharge due to:_  []  Other:_      Ircky Yumi 3/14/2019  11:35 AM    Future Appointments   Date Time Provider Daron Rojas   3/15/2019  9:30 AM Terryann Pals [facility]   3/18/2019 10:30 AM [facility]   3/20/2019 10:30 AM Terryann Pals [facility]   3/22/2019  9:00 AM Terryann Pals [facility]

## 2019-03-15 ENCOUNTER — HOSPITAL ENCOUNTER (OUTPATIENT)
Dept: PHYSICAL THERAPY | Age: 39
Discharge: HOME | End: 2019-03-15
Payer: COMMERCIAL

## 2019-03-15 PROCEDURE — 97112 NEUROMUSCULAR REEDUCATION: CPT

## 2019-03-15 PROCEDURE — 97110 THERAPEUTIC EXERCISES: CPT

## 2019-03-15 NOTE — PROGRESS NOTES
PT DAILY TREATMENT NOTE 10-18    Patient Name: Ken Hernandez  Date:3/15/2019  : 1980  [x]  Patient  Verified  Payor: BLUE CROSS / Plan: Iván Todd [id number] PPO / Product Type: PPO /    In time:935  Out time:1140  Total Treatment Time (min): 72  Visit #: 1 of 8 (PN signed)    Medicare/BCBS Only   Total Timed Codes (min):  55 1:1 Treatment Time:  45       Treatment Area: Pain in right knee [M25.561]  Bilateral leg weakness [R29.898]    SUBJECTIVE  Pain Level (0-10 scale): 0 ( sore)  Any medication changes, allergies to medications, adverse drug reactions, diagnosis change, or new procedure performed?: [x] No    [] Yes (see summary sheet for update)  Subjective functional status/changes:   [] No changes reported  Pt notes he is sore following last visit.     OBJECTIVE    Modality rationale: decrease inflammation and decrease pain to improve the patients ability to increase ease with self care tasks   Min Type Additional Details    [] Estim:  []Unatt       []IFC  []Premod                        []Other:  []w/ice   []w/heat  Position:  Location:    [] Estim: []Att    []TENS instruct  []NMES                    []Other:  []w/US   []w/ice   []w/heat  Position:  Location:    []  Traction: [] Cervical       []Lumbar                       [] Prone          []Supine                       []Intermittent   []Continuous Lbs:  [] before manual  [] after manual    []  Ultrasound: []Continuous   [] Pulsed                           []1MHz   []3MHz W/cm2:  Location:    []  Iontophoresis with dexamethasone         Location: [] Take home patch   [] In clinic   10 [x]  Ice     []  heat  []  Ice massage  []  Laser   []  Anodyne Position:supine  Location:B knee    []  Laser with stim  []  Other:  Position:  Location:    []  Vasopneumatic Device Pressure:       [] lo [] med [] hi   Temperature: [] lo [] med [] hi   [] Skin assessment post-treatment:  []intact []redness- no adverse reaction    []redness  adverse reaction: 15 min Therapeutic Exercise:  [x] See flow sheet :   Rationale: increase ROM and increase strength to improve the patients ability to increase ease with ADls     40 min Neuromuscular Re-education:  [x]  See flow sheet :dynamic gait exercises and standing balance    Rationale: increase strength, improve coordination, improve balance and increase proprioception  to improve the patients ability to increase ease with community ambulation          With   [] TE   [] TA   [] neuro   [] other: Patient Education: [x] Review HEP    [] Progressed/Changed HEP based on:   [] positioning   [] body mechanics   [] transfers   [] heat/ice application    [] other:           Pain Level (0-10 scale) post treatment: 0    ASSESSMENT/Changes in Function: Pt was able to ambulate outdoors for 120 feet with CGA. Pt continues to presents with improved strength, balance and endurence. Patient will continue to benefit from skilled PT services to modify and progress therapeutic interventions, address functional mobility deficits, address ROM deficits, address strength deficits, analyze and address soft tissue restrictions, analyze and cue movement patterns, analyze and modify body mechanics/ergonomics, assess and modify postural abnormalities, address imbalance/dizziness and instruct in home and community integration to attain remaining goals. [x]  See Plan of Care  []  See progress note/recertification  []  See Discharge Summary         Progress towards goals / Updated goals:  Short term goals to be achieved in 1 week:  1) Pt will report full compliance with HEP in order to progress in therapy.   At [facility]: handed out HEP  Current: Met: reports full compliance with HEP. 2/19/19     Long term goals to be achieved in 8 weeks  1) Pt will have an improved tinetti score to >/= 24 indicating a low risk for falls to increase ease with outdoor ambulation  At [facility]: tinetti=13  Current:Progressing: tinetti=17 3/11/19  2) Pt will have an increase in the five time sit to stand test to >/=15 seconds without use of hands to increase ease with household ADLs. At [facility]:  Five time sit to stand: 24 seconds, requires cues of hands, poor eccentric quad control. Current: Progressing: five time sit to stand from 18'' table height, uses B hands = 20 seconds. 3/11/19  3) Pt will demonstrate an increase in SLS to >/=10 seconds on left to increase ease with stair management.   At [facility]: able to hold 2 seconds with hand held support   Current: able to right side: 30 seconds, left side 3 seconds, able to march without HH support 3/11/19  4) Pt will have an improved FOTO to >/= 53 to increase ease with function  At [facility]: FOTO= 42  Current;FOTO= 53 3/11/19  5) Pt will report an increase in function of >/=70% to increase ease with playing with children  At [facility]: 0%  Current; MET, pt reports 80% SOC. 3/5/19        PLAN  [x]  Upgrade activities as tolerated     [x]  Continue plan of care  []  Update interventions per flow sheet       []  Discharge due to:_  []  Other:_      Karen Talavera 3/15/2019  10:18 AM    Future Appointments   Date Time Provider Daron Rojas   3/18/2019 10:30 AM [facility]   3/20/2019 10:30 AM [facility]   3/22/2019  9:00 AM [facility]

## 2019-03-18 ENCOUNTER — HOSPITAL ENCOUNTER (OUTPATIENT)
Dept: PHYSICAL THERAPY | Age: 39
Discharge: HOME | End: 2019-03-18
Payer: COMMERCIAL

## 2019-03-18 PROCEDURE — 97112 NEUROMUSCULAR REEDUCATION: CPT

## 2019-03-18 PROCEDURE — 97110 THERAPEUTIC EXERCISES: CPT

## 2019-03-18 NOTE — PROGRESS NOTES
PT DAILY TREATMENT NOTE 10-18    Patient Name: Chayito Roel  Date:3/18/2019  : 1980  [x]  Patient  Verified  Payor: BLUE CROSS / Plan: [facility] PPO / Product Type: PPO /    In time:1030 Out time:1114  total Treatment Time (min): 40  Visit #: 2 of 8    Medicare/BCBS Only   Total Timed Codes (min):  44 1:1 Treatment Time:  38       Treatment Area: Pain in right knee [M25.561]  Bilateral leg weakness [R29.898]    SUBJECTIVE  Pain Level (0-10 scale):  0 (stiff)  Any medication changes, allergies to medications, adverse drug reactions, diagnosis change, or new procedure performed?: [x] No    [] Yes (see summary sheet for update)  Subjective functional status/changes:   [] No changes reported  Pt notes he was very sore following last visit. Notes the soreness is better today. OBJECTIVE    20 min Therapeutic Exercise:  [x] See flow sheet :   Rationale: increase ROM and increase strength to improve the patients ability to increase ease with ADls     24 min Neuromuscular Re-education:  [x]  See flow sheet : dynamic gait and balance exercises   Rationale: increase strength, improve coordination, improve balance and increase proprioception  to improve the patients ability to increase ease community ambulation        With   [] TE   [] TA   [] neuro   [] other: Patient Education: [x] Review HEP    [] Progressed/Changed HEP based on:   [] positioning   [] body mechanics   [] transfers   [] heat/ice application    [] other:           Pain Level (0-10 scale) post treatment: 0    ASSESSMENT/Changes in Function: Held on further progression due to pt continuing to be sore from last visit. Patient will continue to benefit from skilled PT services to modify and progress therapeutic interventions, address functional mobility deficits, address ROM deficits, address strength deficits, analyze and address soft tissue restrictions and analyze and cue movement patterns to attain remaining goals.      [x] See Plan of Care  []  See progress note/recertification  []  See Discharge Summary         Progress towards goals / Updated goals:  Short term goals to be achieved in 1 week:  1) Pt will report full compliance with HEP in order to progress in therapy. At eval: handed out HEP  Current: Met: reports full compliance with HEP. 2/19/19     Long term goals to be achieved in 8 weeks  1) Pt will have an improved tinetti score to >/= 24 indicating a low risk for falls to increase ease with outdoor ambulation  At eval: tinetti=13  Current:Progressing: tinetti=17 3/11/19  2) Pt will have an increase in the five time sit to stand test to >/=15 seconds without use of hands to increase ease with household ADLs. At [facility]:  Five time sit to stand: 24 seconds, requires cues of hands, poor eccentric quad control. Current: Progressing: five time sit to stand from 18'' table height, uses B hands = 20 seconds. 3/11/19  3) Pt will demonstrate an increase in SLS to >/=10 seconds on left to increase ease with stair management.   At [facility]: able to hold 2 seconds with hand held support   Current: able to right side: 30 seconds, left side 3 seconds, able to march without HH support 3/11/19  4) Pt will have an improved FOTO to >/= 53 to increase ease with function  At [facility]: FOTO= 42  Current;FOTO= 53 3/11/19  5) Pt will report an increase in function of >/=70% to increase ease with playing with children  At [facility]: 0%  Current; MET, pt reports 80% SOC. 3/18/19        PLAN  [x]  Upgrade activities as tolerated     [x]  Continue plan of care  []  Update interventions per flow sheet       []  Discharge due to:_  []  Other:_      Jeremy Harmon 3/18/2019  10:34 AM    Future Appointments   Date Time Provider Daron Rojas   3/20/2019 10:30 AM Татьяна Coreas [facility]   3/22/2019  9:00 AM Татьяна Coreas [facility]

## 2019-03-20 ENCOUNTER — APPOINTMENT (OUTPATIENT)
Dept: PHYSICAL THERAPY | Age: 39
End: 2019-03-20
Payer: COMMERCIAL

## 2019-03-22 ENCOUNTER — APPOINTMENT (OUTPATIENT)
Dept: PHYSICAL THERAPY | Age: 39
End: 2019-03-22
Payer: COMMERCIAL

## 2019-04-02 ENCOUNTER — HOSPITAL ENCOUNTER (OUTPATIENT)
Dept: PHYSICAL THERAPY | Age: 39
Discharge: HOME | End: 2019-04-02
Payer: COMMERCIAL

## 2019-04-02 PROCEDURE — 97112 NEUROMUSCULAR REEDUCATION: CPT

## 2019-04-02 PROCEDURE — 97110 THERAPEUTIC EXERCISES: CPT

## 2019-04-02 NOTE — PROGRESS NOTES
PT DAILY TREATMENT NOTE 10-18 Patient Name: Boyd Stockton Date:2019 : 1980 [x]  Patient  Verified Payor: BLUE CROSS / Plan: [facility] PPO / Product Type: PPO / In time:1030  Out time:1130 Total Treatment Time (min): 60 Visit #: 3 of 8 Medicare/BCBS Only Total Timed Codes (min):  60 1:1 Treatment Time:  60  
 
 
Treatment Area: Pain in right knee [M25.561] Bilateral leg weakness [R29.898] SUBJECTIVE Pain Level (0-10 scale): 0 Any medication changes, allergies to medications, adverse drug reactions, diagnosis change, or new procedure performed?: [x] No    [] Yes (see summary sheet for update) Subjective functional status/changes:   [] No changes reported Pt has no new complaints at this time. Notes he is tired from his trip. OBJECTIVE 20 min Therapeutic Exercise:  [x] See flow sheet :  
Rationale: increase ROM and increase strength to improve the patients ability to increase ease with ADls 40 min Neuromuscular Re-education:  [x]  See flow sheet : dynamic gait and balance exercises. Rationale: increase strength, improve coordination, improve balance and increase proprioception  to improve the patients ability to increase ease with community ambulation With 
 [] TE 
 [] TA 
 [] neuro 
 [] other: Patient Education: [x] Review HEP [] Progressed/Changed HEP based on:  
[] positioning   [] body mechanics   [] transfers   [] heat/ice application   
[] other:   
 
  
 
Pain Level (0-10 scale) post treatment: 0 
 
ASSESSMENT/Changes in Function: Held on progressing pt in dynamic gait or balance exercises due to fatigue and soreness.   
 
Patient will continue to benefit from skilled PT services to modify and progress therapeutic interventions, address functional mobility deficits, address ROM deficits, address strength deficits, analyze and address soft tissue restrictions, analyze and cue movement patterns, analyze and modify body mechanics/ergonomics, assess and modify postural abnormalities, address imbalance/dizziness and instruct in home and community integration to attain remaining goals. [x]  See Plan of Care 
[]  See progress note/recertification 
[]  See Discharge Summary Progress towards goals / Updated goals: 
Short term goals to be achieved in 1 week: 
1) Pt will report full compliance with HEP in order to progress in therapy. At [facility]: handed out HEP Current: Met: reports full compliance with HEP. 2/19/19 
  
Long term goals to be achieved in 8 weeks 1) Pt will have an improved tinetti score to >/= 24 indicating a low risk for falls to increase ease with outdoor ambulation At [facility]: tinetti=13 Current:Progressing: tinetti=17 3/11/19 
2) Pt will have an increase in the five time sit to stand test to >/=15 seconds without use of hands to increase ease with household ADLs. At [facility]:  Five time sit to stand: 24 seconds, requires cues of hands, poor eccentric quad control. Current: Progressing: five time sit to stand from 18'' table height, uses B hands = 20 seconds. 3/11/19 3) Pt will demonstrate an increase in SLS to >/=10 seconds on left to increase ease with stair management. At [facility]: able to hold 2 seconds with hand held support  
Current: able to right side: 30 seconds, left side 3 seconds, able to march without HH support 3/11/19 4) Pt will have an improved FOTO to >/= 53 to increase ease with function At [facility]: FOTO= 42 
Current;FOTO= 53 3/11/19 
5) Pt will report an increase in function of >/=70% to increase ease with playing with children At [facility]: 0% Current; MET, pt reports 80-85% SOC. 4/1/19 PLAN [x]  Upgrade activities as tolerated     [x]  Continue plan of care 
[]  Update interventions per flow sheet      
[]  Discharge due to:_ 
[]  Other:_ Andrew Eye 4/2/2019  10:27 AM 
 
Future Appointments Date Time Provider Daron Rojas 4/2/2019 10:30 AM Taina Portal [facility]  
 4/5/2019  9:00 AM [facility]  
4/9/2019 10:30 AM [facility]  
4/12/2019 10:00 AM [facility]  
4/16/2019 10:30 AM [facility]  
4/19/2019  9:30 AM [facility]  
4/23/2019 10:30 AM [facility]  
4/26/2019 10:30 AM [facility]  
4/30/2019 10:30 AM Natasha Ashraf [facility]  
5/3/2019 10:00 AM Natasha Ashraf [facility]

## 2019-04-05 ENCOUNTER — HOSPITAL ENCOUNTER (OUTPATIENT)
Dept: PHYSICAL THERAPY | Age: 39
Discharge: HOME | End: 2019-04-05
Payer: COMMERCIAL

## 2019-04-05 PROCEDURE — 97112 NEUROMUSCULAR REEDUCATION: CPT

## 2019-04-09 ENCOUNTER — HOSPITAL ENCOUNTER (OUTPATIENT)
Dept: PHYSICAL THERAPY | Age: 39
Discharge: HOME | End: 2019-04-09
Payer: COMMERCIAL

## 2019-04-09 PROCEDURE — 97110 THERAPEUTIC EXERCISES: CPT

## 2019-04-09 PROCEDURE — 97112 NEUROMUSCULAR REEDUCATION: CPT

## 2019-04-09 NOTE — PROGRESS NOTES
Malissa Mclean PT DAILY TREATMENT NOTE 10-18 Patient Name: Juanita Officer Date:2019 : 1980 [x]  Patient  Verified Payor: BLUE CROSS / Plan: [facility] PPO / Product Type: PPO / In PLFN:[id number] Out time:1123 Total Treatment Time (min): 50 Visit #: 5 of 8 Medicare/BCBS Only Total Timed Codes (min):  50 1:1 Treatment Time:  45 Treatment Area: Pain in right knee [M25.561] Bilateral leg weakness [R29.898] SUBJECTIVE Pain Level (0-10 scale): 0 Any medication changes, allergies to medications, adverse drug reactions, diagnosis change, or new procedure performed?: [x] No    [] Yes (see summary sheet for update) Subjective functional status/changes:   [] No changes reported Pt has no new complaints at this time OBJECTIVE 12 min Therapeutic Exercise:  [x] See flow sheet :  
Rationale: increase ROM and increase strength to improve the patients ability to increase ease with ADls 38 min Neuromuscular Re-education:  [x]  See flow sheet :dynamic gait and standing balance exercises , includes review of all goes and FOTO Rationale: increase strength, improve coordination, improve balance and increase proprioception  to improve the patients ability to improve ease with community ambulation With 
 [] TE 
 [] TA 
 [] neuro 
 [] other: Patient Education: [x] Review HEP [] Progressed/Changed HEP based on:  
[] positioning   [] body mechanics   [] transfers   [] heat/ice application   
[] other:   
 
  
 
Pain Level (0-10 scale) post treatment: 0 
 
ASSESSMENT/Changes in Function: Pt is progressing well towards long term goals. Pt demonstrated with increased LE strength, improved single leg balance, improved gait mechanics and had an improved score of the tinitetti and five time sit to stand. Pt continues to report difficulty with out door ambulation and stair navigation.  
 
Patient will continue to benefit from skilled PT services to modify and progress therapeutic interventions, address functional mobility deficits, address ROM deficits, address strength deficits, analyze and address soft tissue restrictions, analyze and cue movement patterns, analyze and modify body mechanics/ergonomics, assess and modify postural abnormalities and instruct in home and community integration to attain remaining goals. [x]  See Plan of Care 
[]  See progress note/recertification 
[]  See Discharge Summary Progress towards goals / Updated goals: 
 
Short term goals to be achieved in 1 week: 
1) Pt will report full compliance with HEP in order to progress in therapy. At eval: handed out HEP Current: Met: reports full compliance with HEP. 2/19/19 
  
Long term goals to be achieved in 8 weeks 1) Pt will have an improved tinetti score to >/= 24 indicating a low risk for falls to increase ease with outdoor ambulation Current:Progressing: tinetti=17 3/11/19 Current: Progressing: tinettie= 21 4/9/10 
2) Pt will have an increase in the five time sit to stand test to >/=15 seconds without use of hands to increase ease with household ADLs. Candance Huger At PN: Progressing: five time sit to stand from 18'' table height, uses B hands = 20 seconds. Current: Progressing five time sit to stands without use of hands 20 seconds. 4/9/19 
3) Pt will demonstrate an increase in SLS to >/=10 seconds on left to increase ease with stair management. At PN: able to right side: 30 seconds, left side 3 seconds, able to march without HH support 3/11/19 Current: Progressing: left SLS= 15 seconds 4/9/19 4) Pt will have an improved FOTO to >/= 53 to increase ease with function At PN: FOTO= 53 3/11/19 Current: FOTO= 49 4/9/10   
5) Pt will report an increase in function of >/=70% to increase ease with playing with children At eval: 0% At PM MET, pt reports 80-85% SOC. 4/1/19 Updated goals 1) Pt will be able to ambulate 200 feet outdoors with SBA-CGA to increase overall independence Current: CGA-MN A for 100 feet PLAN [x]  Upgrade activities as tolerated     [x]  Continue plan of care 
[]  Update interventions per flow sheet      
[]  Discharge due to:_ 
[]  Other:_ Kiran Grimaldo 4/9/2019  10:26 AM 
 
Future Appointments Date Time Provider Daron Rojas 4/9/2019 10:30 AM [facility]  
4/12/2019 10:00 AM [facility]  
[facility]  
[facility]19[facility]2019  [facility]  
4/23/2019 10:30 AM [facility]  
4/26/2019 10:30 AM [facility]  
4/30/2019 10:30 AM [facility]  
5/3/2019 10:00 AM Trae Mujica PTA [facility]

## 2019-04-12 ENCOUNTER — APPOINTMENT (OUTPATIENT)
Dept: PHYSICAL THERAPY | Age: 39
End: 2019-04-12
Payer: COMMERCIAL

## 2019-04-16 ENCOUNTER — HOSPITAL ENCOUNTER (OUTPATIENT)
Dept: PHYSICAL THERAPY | Age: 39
Discharge: HOME | End: 2019-04-16
Payer: COMMERCIAL

## 2019-04-16 PROCEDURE — 97112 NEUROMUSCULAR REEDUCATION: CPT

## 2019-04-16 NOTE — PROGRESS NOTES
PT DAILY TREATMENT NOTE 10-18 Patient Name: Maribeth Lynn Date:2019 : 1980 [x]  Patient  Verified Payor: BLUE CROSS / Plan: [facility] PPO / Product Type: PPO / In time:1030 Out time:1130 Total Treatment Time (min): 60 Visit #: 1 of 9 Medicare/BCBS Only Total Timed Codes (min):  60 1:1 Treatment Time:  45 Treatment Area: Pain in right knee [M25.561] Bilateral leg weakness [R29.898] SUBJECTIVE Pain Level (0-10 scale): 0 Any medication changes, allergies to medications, adverse drug reactions, diagnosis change, or new procedure performed?: [x] No    [] Yes (see summary sheet for update) Subjective functional status/changes:   [] No changes reported Pt notes that he is ambulating short distances within the home without his cane. OBJECTIVE 20 min Therapeutic Exercise:  [x] See flow sheet :  
Rationale: increase ROM and increase strength to improve the patients ability to improve ease with ADls 40 min Neuromuscular Re-education:  [x]  See flow sheet :dynamic gait and standing balance exercises. Rationale: increase strength, improve coordination, improve balance and increase proprioception  to improve the patients ability to improve ease with community mobility With 
 [] TE 
 [] TA 
 [] neuro 
 [] other: Patient Education: [x] Review HEP [] Progressed/Changed HEP based on:  
[] positioning   [] body mechanics   [] transfers   [] heat/ice application   
[] other:   
 
Other Objective/Functional Measures:  
 
Pain Level (0-10 scale) post treatment: 0 
 
ASSESSMENT/Changes in Function: Progressed pt in forward and lateral step overs. Pt is able to ambulate up to 240 feet with SBA without use of AD.  
 
Patient will continue to benefit from skilled PT services to modify and progress therapeutic interventions, address functional mobility deficits, address ROM deficits, address strength deficits, analyze and address soft tissue restrictions, analyze and cue movement patterns, analyze and modify body mechanics/ergonomics, assess and modify postural abnormalities, address imbalance/dizziness and instruct in home and community integration to attain remaining goals. []  See Plan of Care 
[]  See progress note/recertification 
[]  See Discharge Summary Progress towards goals / Updated goals: 
Short term goals to be achieved in 1 week: 
1) Pt will report full compliance with HEP in order to progress in therapy. At eval: handed out HEP Current: Met: reports full compliance with HEP. 2/19/19 
  
Long term goals to be achieved in 8 weeks 1) Pt will have an improved tinetti score to >/= 24 indicating a low risk for falls to increase ease with outdoor ambulation Current:Progressing: tinetti=17 3/11/19 Current: Progressing: tinettie= 21 4/9/10 
2) Pt will have an increase in the five time sit to stand test to >/=15 seconds without use of hands to increase ease with household ADLs. Carol Mclean At PN: Progressing: five time sit to stand from 18'' table height, uses B hands = 20 seconds. Current: Progressing five time sit to stands without use of hands 20 seconds. 4/9/19 
3) Pt will demonstrate an increase in SLS to >/=10 seconds on left to increase ease with stair management. At PN: able to right side: 30 seconds, left side 3 seconds, able to march without HH support 3/11/19 Current: Progressing: left SLS= 15 seconds 4/9/19 4) Pt will have an improved FOTO to >/= 53 to increase ease with function At PN: FOTO= 53 3/11/19 Current: FOTO= 49 4/9/10   
5) Pt will report an increase in function of >/=70% to increase ease with playing with children At eval: 0% At PM MET, pt reports 80-85% SOC. 4/1/19 
  
Updated goals 1) Pt will be able to ambulate 200 feet outdoors with SBA-CGA to increase overall independence Current: CGA-MN A for 100 feet PLAN [x]  Upgrade activities as tolerated     [x]  Continue plan of care []  Update interventions per flow sheet      
[]  Discharge due to:_ 
[]  Other:_ Krista Vincenta 4/16/2019  10:34 AM 
 
Future Appointments Date Time Provider Daron Rojas 4/19/2019  9:30 AM Driss Daunt [facility]  
4/23/2019 10:30 AM Driss Daunt [facility]  
4/26/2019 10:30 AM Driss Daunt [facility]  
4/30/2019 10:30 AM Driss Daunt [facility]  
5/3/2019 10:00 AM Yannick Beck PTA [facility]

## 2019-04-19 ENCOUNTER — HOSPITAL ENCOUNTER (OUTPATIENT)
Dept: PHYSICAL THERAPY | Age: 39
Discharge: HOME | End: 2019-04-19
Payer: COMMERCIAL

## 2019-04-19 PROCEDURE — 97112 NEUROMUSCULAR REEDUCATION: CPT

## 2019-04-19 NOTE — PROGRESS NOTES
PT DAILY TREATMENT NOTE 10-18 Patient Name: Amanda Marie Date:2019 : 1980 [x]  Patient  Verified Payor: BLUE CROSS / Plan: [facility] PPO / Product Type: PPO / In time:933 Out time:1025 Total Treatment Time (min): 52 Visit #: 2 of 9 Medicare/BCBS Only Total Timed Codes (min):  52 1:1 Treatment Time:  28 Treatment Area: Pain in right knee [M25.561] Bilateral leg weakness [R29.898] SUBJECTIVE Pain Level (0-10 scale): 0 Any medication changes, allergies to medications, adverse drug reactions, diagnosis change, or new procedure performed?: [x] No    [] Yes (see summary sheet for update) Subjective functional status/changes:   [] No changes reported Pt has no new complaints at this time OBJECTIVE 10 min Therapeutic Exercise:  [x] See flow sheet :  
Rationale: increase ROM and increase strength to improve the patients ability to increase ease with ADLs 42 min Neuromuscular Re-education:  [x]  See flow sheet :balance and gait exercises Rationale: improve coordination, improve balance and increase proprioception  to improve the patients ability to improve ease with community ambulation With 
 [] TE 
 [] TA 
 [] neuro 
 [] other: Patient Education: [x] Review HEP [] Progressed/Changed HEP based on:  
[] positioning   [] body mechanics   [] transfers   [] heat/ice application   
[] other:   
 
Other Objective/Functional Measures:   
 
Pain Level (0-10 scale) post treatment: 0 
 
ASSESSMENT/Changes in Function: Continue to progress pt in dynamic gait exercises Patient will continue to benefit from skilled PT services to modify and progress therapeutic interventions, address functional mobility deficits, address ROM deficits, address strength deficits, analyze and address soft tissue restrictions, analyze and cue movement patterns, analyze and modify body mechanics/ergonomics, assess and modify postural abnormalities, address imbalance/dizziness and instruct in home and community integration to attain remaining goals. [x]  See Plan of Care 
[]  See progress note/recertification 
[]  See Discharge Summary Progress towards goals / Updated goals: 
Short term goals to be achieved in 1 week: 
1) Pt will report full compliance with HEP in order to progress in therapy. At [facility]: handed out HEP Current: Met: reports full compliance with HEP. 2/19/19 
  
Long term goals to be achieved in 8 weeks 1) Pt will have an improved tinetti score to >/= 24 indicating a low risk for falls to increase ease with outdoor ambulation Current:Progressing: tinetti=17 3/11/19 Current: Progressing: tinettie= 21 4/9/10 
2) Pt will have an increase in the five time sit to stand test to >/=15 seconds without use of hands to increase ease with household ADLs. Reji Means At PN: Progressing: five time sit to stand from 18'' table height, uses B hands = 20 seconds.  
Current: Progressing five time sit to stands without use of hands 20 seconds. 4/9/19 
3) Pt will demonstrate an increase in SLS to >/=10 seconds on left to increase ease with stair management. At PN: able to right side: 30 seconds, left side 3 seconds, able to march without HH support 3/11/19 Current: Progressing: left SLS= 15 seconds 4/9/19 4) Pt will have an improved FOTO to >/= 53 to increase ease with function At PN: FOTO= 53 3/11/19 Current: FOTO= 49 4/9/10   
5) Pt will report an increase in function of >/=70% to increase ease with playing with children At eval: 0% At [facility], pt reports 80-85% SOC. 4/1/19 PLAN [x]  Upgrade activities as tolerated     [x]  Continue plan of care 
[]  Update interventions per flow sheet      
[]  Discharge due to:_ 
[]  Other:_ Ursula Sawyer 4/19/2019  9:35 AM 
 
Future Appointments Date Time Provider Daron Rojas 4/23/2019 10:30 [facility]  
4/26/2019 10:30 [facility]  
 4/30/2019 10:30 AM Jovanna Carrasco [facility]  
5/3/2019 10:00 [facility]

## 2019-04-23 ENCOUNTER — HOSPITAL ENCOUNTER (OUTPATIENT)
Dept: PHYSICAL THERAPY | Age: 39
Discharge: HOME | End: 2019-04-23
Payer: COMMERCIAL

## 2019-04-23 PROCEDURE — 97112 NEUROMUSCULAR REEDUCATION: CPT

## 2019-04-23 NOTE — PROGRESS NOTES
PT DAILY TREATMENT NOTE 10-18 Patient Name: Denis Page Date:2019 : 1980 [x]  Patient  Verified Payor: BLUE CROSS / Plan: [facility] PPO / Product Type: PPO / In PRMA:[id number]  Out time:1128 Total Treatment Time (min): 56 Visit #: 3 of 9 Medicare/BCBS Only Total Timed Codes (min):  56 1:1 Treatment Time:  45 Treatment Area: Pain in right knee [M25.561] Bilateral leg weakness [R29.898] SUBJECTIVE Pain Level (0-10 scale): 0 Any medication changes, allergies to medications, adverse drug reactions, diagnosis change, or new procedure performed?: [x] No    [] Yes (see summary sheet for update) Subjective functional status/changes:   [] No changes reported Pt has no new complaints at this time OBJECTIVE 16 min Therapeutic Exercise:  [x] See flow sheet :  
Rationale: increase ROM and increase strength to improve the patients ability to increase ease with ADLs 40 min Neuromuscular Re-education:  [x]  See flow sheet :dynamic gait and balance exercises Rationale: increase strength, improve coordination, improve balance and increase proprioception  to improve the patients ability to increase ease with community mobility With 
 [] TE 
 [] TA 
 [] neuro 
 [] other: Patient Education: [x] Review HEP [] Progressed/Changed HEP based on:  
[] positioning   [] body mechanics   [] transfers   [] heat/ice application   
[] other:   
 
  
 
Pain Level (0-10 scale) post treatment: 0 
 
ASSESSMENT/Changes in Function: Continue to progress pt in dynamic gait activities. Pt is able to ambulate 230 feet with SBA on even ground.  
 
Patient will continue to benefit from skilled PT services to modify and progress therapeutic interventions, address functional mobility deficits, address ROM deficits, address strength deficits, analyze and address soft tissue restrictions, analyze and cue movement patterns, analyze and modify body mechanics/ergonomics, assess and modify postural abnormalities, address imbalance/dizziness and instruct in home and community integration to attain remaining goals. [x]  See Plan of Care 
[]  See progress note/recertification 
[]  See Discharge Summary Progress towards goals / Updated goals: 
Short term goals to be achieved in 1 week: 
1) Pt will report full compliance with HEP in order to progress in therapy. At eval: handed out HEP Current: Met: reports full compliance with HEP. 2/19/19 
  
Long term goals to be achieved in 8 weeks 1) Pt will have an improved tinetti score to >/= 24 indicating a low risk for falls to increase ease with outdoor ambulation Current:Progressing: tinetti=17 3/11/19 Current: Progressing: tinettie= 21 4/9/10 
2) Pt will have an increase in the five time sit to stand test to >/=15 seconds without use of hands to increase ease with household ADLs. Braulio Bryan At PN: Progressing: five time sit to stand from 18'' table height, uses B hands = 20 seconds.  
Current: Progressing five time sit to stands without use of hands 20 seconds. 4/9/19 
3) Pt will demonstrate an increase in SLS to >/=10 seconds on left to increase ease with stair management. At PN: able to right side: 30 seconds, left side 3 seconds, able to march without HH support 3/11/19 Current: Progressing: left SLS= 15 seconds 4/9/19 4) Pt will have an improved FOTO to >/= 53 to increase ease with function At PN: FOTO= 53 3/11/19 Current: FOTO= 49 4/9/10   
5) Pt will report an increase in function of >/=70% to increase ease with playing with children At eval: 0% At [facility], pt reports 90% [facility] 4/23/19 PLAN [x]  Upgrade activities as tolerated     [x]  Continue plan of care 
[]  Update interventions per flow sheet      
[]  Discharge due to:_ 
[]  Other:_ Sarah Fine 4/23/2019  10:24 AM 
 
Future Appointments Date Time Provider Daron Rojas 4/23/2019 10:30 AM Lluvia Khan [facility]  
 4/26/2019 10:30 AM Corinna Dave [facility]  
4/30/2019 10:30 AM Corinna Dave [facility]  
5/3/2019 10:00 AM Caity Rogel PTA [facility]

## 2019-04-26 ENCOUNTER — HOSPITAL ENCOUNTER (OUTPATIENT)
Dept: PHYSICAL THERAPY | Age: 39
Discharge: HOME | End: 2019-04-26
Payer: COMMERCIAL

## 2019-04-26 PROCEDURE — 97112 NEUROMUSCULAR REEDUCATION: CPT

## 2019-04-26 PROCEDURE — 97110 THERAPEUTIC EXERCISES: CPT

## 2019-04-26 NOTE — PROGRESS NOTES
PT DAILY TREATMENT NOTE 10-18 Patient Name: Isaias Geller Date:2019 : 1980 [x]  Patient  Verified Payor: BLUE CROSS / Plan: [facility] PPO / Product Type: PPO / In time:1030  Out time:1120 Total Treatment Time (min): 50 Visit #: 4 of 9 Medicare/BCBS Only Total Timed Codes (min):  50 1:1 Treatment Time:  40 Treatment Area: Pain in right knee [M25.561] Bilateral leg weakness [R29.898] SUBJECTIVE Pain Level (0-10 scale): 0 Any medication changes, allergies to medications, adverse drug reactions, diagnosis change, or new procedure performed?: [x] No    [] Yes (see summary sheet for update) Subjective functional status/changes:   [] No changes reported Pt notes he continues to practice walking in the backyard for short distances without use of cane. OBJECTIVE 20 min Therapeutic Exercise:  [x] See flow sheet :  
Rationale: increase ROM and increase strength to improve the patients ability to improve ease with ADls 30 min Neuromuscular Re-education:  [x]  See flow sheet :standing balance and dynamic balance exercises Rationale: increase strength, improve coordination, improve balance and increase proprioception  to improve the patients ability to increase ease with outdoor and community ambulation With 
 [] TE 
 [] TA 
 [] neuro 
 [] other: Patient Education: [x] Review HEP [] Progressed/Changed HEP based on:  
[] positioning   [] body mechanics   [] transfers   [] heat/ice application   
[] other:   
  
 
Pain Level (0-10 scale) post treatment: 0 
 
ASSESSMENT/Changes in Function: Pt continues to demonstrate with improved static and dynamic balance. Pt continue to require SBA with ambulation without AD.  
 
Patient will continue to benefit from skilled PT services to modify and progress therapeutic interventions, address functional mobility deficits, address ROM deficits, address strength deficits, analyze and address soft tissue restrictions, analyze and cue movement patterns, analyze and modify body mechanics/ergonomics, assess and modify postural abnormalities, address imbalance/dizziness and instruct in home and community integration to attain remaining goals. [x]  See Plan of Care 
[]  See progress note/recertification 
[]  See Discharge Summary Progress towards goals / Updated goals: 
Short term goals to be achieved in 1 week: 
1) Pt will report full compliance with HEP in order to progress in therapy. At eval: handed out HEP Current: Met: reports full compliance with HEP. 2/19/19 
  
Long term goals to be achieved in 8 weeks 1) Pt will have an improved tinetti score to >/= 24 indicating a low risk for falls to increase ease with outdoor ambulation Current:Progressing: tinetti=17 3/11/19 Current: Progressing: tinettie= 21 4/9/10 
2) Pt will have an increase in the five time sit to stand test to >/=15 seconds without use of hands to increase ease with household ADLs. Shanita Narayan At PN: Progressing: five time sit to stand from 18'' table height, uses B hands = 20 seconds.  
Current: Progressing five time sit to stands without use of hands 20 seconds. 4/9/19 
3) Pt will demonstrate an increase in SLS to >/=10 seconds on left to increase ease with stair management. At PN: able to right side: 30 seconds, left side 3 seconds, able to march without HH support 3/11/19 Current: Progressing: left SLS= 15 seconds 4/9/19 4) Pt will have an improved FOTO to >/= 53 to increase ease with function At PN: FOTO= 53 3/11/19 Current: FOTO= 49 4/9/10   
5) Pt will report an increase in function of >/=70% to increase ease with playing with children At eval: 0% At [facility], pt reports 90% [facility] 4/23/19 
  
  
 
PLAN [x]  Upgrade activities as tolerated     [x]  Continue plan of care 
[]  Update interventions per flow sheet      
[]  Discharge due to:_ 
[]  Other:_ Alicia Gutiérrez 4/26/2019  10:24 AM 
 
Future Appointments Date Time Provider Daron Rojas 4/26/2019 10:30 AM Ines Balderas [facility]  
4/30/2019 10:30 AM Ines Balderas [facility]  
5/3/2019 10:00 AM Fayrene Lanes, PTA [facility]

## 2019-04-30 ENCOUNTER — HOSPITAL ENCOUNTER (OUTPATIENT)
Dept: PHYSICAL THERAPY | Age: 39
Discharge: HOME | End: 2019-04-30
Payer: COMMERCIAL

## 2019-04-30 PROCEDURE — 97112 NEUROMUSCULAR REEDUCATION: CPT

## 2019-04-30 NOTE — PROGRESS NOTES
PT DAILY TREATMENT NOTE 10-18 Patient Name: Karis Olea Date:2019 : 1980 [x]  Patient  Verified Payor: BLUE CROSS / Plan: [facility]PO / Product Type: PPO / In time:1030  Out time:1124 Total Treatment Time (min): 54 Visit #: 5 of 9 Medicare/BCBS Only Total Timed Codes (min):  54 1:1 Treatment Time:  45 Treatment Area: Pain in right knee [M25.561] Bilateral leg weakness [R29.898] SUBJECTIVE Pain Level (0-10 scale): 0 Any medication changes, allergies to medications, adverse drug reactions, diagnosis change, or new procedure performed?: [x] No    [] Yes (see summary sheet for update) Subjective functional status/changes:   [x] No changes reported New changes reported. States he has been walking more outdoors for short distances without cane. Notes he feels more confident with longer distances outdoor when using cane. OBJECTIVE 14 min Therapeutic Exercise:  [x] See flow sheet :  
Rationale: increase ROM and increase strength to improve the patients ability to increase ease with ADls 40 min Neuromuscular Re-education:  [x]  See flow sheet :dynamic gait and balance exercises Rationale: increase strength, improve coordination, improve balance and increase proprioception  to improve the patients ability to improve ease with household and community ADls With 
 [] TE 
 [] TA 
 [] neuro 
 [] other: Patient Education: [x] Review HEP [] Progressed/Changed HEP based on:  
[] positioning   [] body mechanics   [] transfers   [] heat/ice application   
[] other:   
 
 
 
Pain Level (0-10 scale) post treatment: 0 
 
ASSESSMENT/Changes in Function:  Pt is able to ambulate unlimited distances with cane independently. Pt is able to navigate over obstacles without use of cane and walk shorter distances without cane with SBA. Pt reports an overall improvement in functional mobility and (I) of 90% SOC. Plan to DC next week. Patient will continue to benefit from skilled PT services to modify and progress therapeutic interventions, address functional mobility deficits, address ROM deficits, address strength deficits, analyze and address soft tissue restrictions, analyze and cue movement patterns, analyze and modify body mechanics/ergonomics, assess and modify postural abnormalities, address imbalance/dizziness and instruct in home and community integration to attain remaining goals. [x]  See Plan of Care 
[]  See progress note/recertification 
[]  See Discharge Summary Progress towards goals / Updated goals: 
Short term goals to be achieved in 1 week: 
1) Pt will report full compliance with HEP in order to progress in therapy. At eval: handed out HEP Current: Met: reports full compliance with HEP. 2/19/19 
  
Long term goals to be achieved in 8 weeks 1) Pt will have an improved tinetti score to >/= 24 indicating a low risk for falls to increase ease with outdoor ambulation Current:Progressing: tinetti=17 3/11/19 Current: Progressing: tinettie= 21 4/9/10 
2) Pt will have an increase in the five time sit to stand test to >/=15 seconds without use of hands to increase ease with household ADLs. Ortiz Hodgson At PN: Progressing: five time sit to stand from 18'' table height, uses B hands = 20 seconds.  
Current: Progressing five time sit to stands without use of hands 20 seconds 4/30/19 3) Pt will demonstrate an increase in SLS to >/=10 seconds on left to increase ease with stair management. At PN: able to right side: 30 seconds, left side 3 seconds, able to march without HH support 3/11/19 Current: Progressing: left SLS= 23 seconds 4/30/19 4) Pt will have an improved FOTO to >/= 53 to increase ease with function At PN: FOTO= 53 3/11/19 Current: FOTO= 49 4/9/10   
5) Pt will report an increase in function of >/=70% to increase ease with playing with children At eval: 0% [facility], pt reports 90% [facility] 4/23/19 
  
 
PLAN 
 [x]  Upgrade activities as tolerated     [x]  Continue plan of care 
[]  Update interventions per flow sheet      
[]  Discharge due to:_ 
[]  Other:_ Tom Randhawa 4/30/2019  10:28 AM 
 
Future Appointments Date Time Provider Daron Rojas 4/30/2019 10:30 AM Brielle Mancini [facility]  
5/3/2019 10:00 AM Lupe Oseguera PTA [facility]

## 2019-05-03 ENCOUNTER — APPOINTMENT (OUTPATIENT)
Dept: PHYSICAL THERAPY | Age: 39
End: 2019-05-03
Payer: COMMERCIAL

## 2019-05-08 ENCOUNTER — HOSPITAL ENCOUNTER (OUTPATIENT)
Dept: PHYSICAL THERAPY | Age: 39
Discharge: HOME | End: 2019-05-08
Payer: COMMERCIAL

## 2019-05-08 PROCEDURE — 97110 THERAPEUTIC EXERCISES: CPT

## 2019-05-08 PROCEDURE — 97112 NEUROMUSCULAR REEDUCATION: CPT

## 2019-05-08 NOTE — PROGRESS NOTES
PT DAILY TREATMENT NOTE 10-18 Patient Name: Roverto Sevilla Date:2019 : 1980 [x]  Patient  Verified Payor: BLUE CROSS / Plan: [facility] PPO / Product Type: PPO / In time:1100  Out time: 2802 Total Treatment Time (min): 53 Visit #: 6 of 9 Medicare/BCBS Only Total Timed Codes (min):  53 1:1 Treatment Time:  45 Treatment Area: Pain in right knee [M25.561] Bilateral leg weakness [R29.898] SUBJECTIVE Pain Level (0-10 scale): 0 Any medication changes, allergies to medications, adverse drug reactions, diagnosis change, or new procedure performed?: [x] No    [] Yes (see summary sheet for update) Subjective functional status/changes:   [] No changes reported Pt has no new complaints at this time OBJECTIVE 20 min Therapeutic Exercise:  [x] See flow sheet :  
Rationale: increase ROM and increase strength to improve the patients ability to increase ease with community ADls 33 min Neuromuscular Re-education:  [x]  See flow sheet : dynamic gait activities standing balance activities Rationale: improve coordination, improve balance and increase proprioception  to improve the patients ability to increase ease with community ambulation With 
 [] TE 
 [] TA 
 [] neuro 
 [] other: Patient Education: [x] Review HEP [] Progressed/Changed HEP based on:  
[] positioning   [] body mechanics   [] transfers   [] heat/ice application   
[] other:   
 
Other Objective/Functional Measures:   
 
Pain Level (0-10 scale) post treatment: 0 
 
ASSESSMENT/Changes in Function: Pt is progressing well towards long term goals and reports increased (I) with community ambulation and will be DC next visit.  
 
Patient will continue to benefit from skilled PT services to modify and progress therapeutic interventions, address functional mobility deficits, address ROM deficits, address strength deficits, analyze and address soft tissue restrictions, analyze and cue movement patterns, analyze and modify body mechanics/ergonomics, assess and modify postural abnormalities, address imbalance/dizziness and instruct in home and community integration to attain remaining goals. [x]  See Plan of Care 
[]  See progress note/recertification 
[]  See Discharge Summary Progress towards goals / Updated goals: 
Short term goals to be achieved in 1 week: 
1) Pt will report full compliance with HEP in order to progress in therapy. At eval: handed out HEP Current: Met: reports full compliance with HEP. 2/19/19 
  
Long term goals to be achieved in 8 weeks 1) Pt will have an improved tinetti score to >/= 24 indicating a low risk for falls to increase ease with outdoor ambulation At PN[de-identified] Progressing: tinettie= 21 4/9/10 
2) Pt will have an increase in the five time sit to stand test to >/=15 seconds without use of hands to increase ease with household ADLs. .  
At PN: Progressing five time sit to stands without use of hands 20 seconds 4/30/19 3) Pt will demonstrate an increase in SLS to >/=10 seconds on left to increase ease with stair management. PN: Progressing: left SLS= 23 seconds 4/30/19 4) Pt will have an improved FOTO to >/= 53 to increase ease with function PN: FOTO= 49 4/9/10   
Current: MET: FOTO= 59  
5) Pt will report an increase in function of >/=70% to increase ease with playing with children At eval: 0% At [facility], pt reports 90% [facility] 4/23/19 PLAN [x]  Upgrade activities as tolerated     [x]  Continue plan of care 
[]  Update interventions per flow sheet      
[]  Discharge due to:_ 
[]  Other:_ Ursula Raw 5/8/2019  10:56 AM 
 
Future Appointments Date Time Provider Daron Rojas 5/8/2019 11:00 AM Sita Esparza [facility]  
5/10/2019  7:30 AM [facility]

## 2019-05-10 ENCOUNTER — HOSPITAL ENCOUNTER (OUTPATIENT)
Dept: PHYSICAL THERAPY | Age: 39
Discharge: HOME | End: 2019-05-10
Payer: COMMERCIAL

## 2019-05-10 PROCEDURE — 97112 NEUROMUSCULAR REEDUCATION: CPT

## 2019-05-10 PROCEDURE — 97110 THERAPEUTIC EXERCISES: CPT

## 2019-05-10 NOTE — PROGRESS NOTES
PT DAILY TREATMENT NOTE 10-18 Patient Name: Andi Duvall Date:5/10/2019 : 1980 [x]  Patient  Verified Payor: BLUE CROSS / Plan: [facility] PPO / Product Type: PPO / In time:734  Out time:824 Total Treatment Time (min): 50 Visit #: 7 of 9 Medicare/BCBS Only Total Timed Codes (min):  50 1:1 Treatment Time:  50 Treatment Area: Pain in right knee [M25.561] Bilateral leg weakness [R29.898] SUBJECTIVE Pain Level (0-10 scale): 0 Any medication changes, allergies to medications, adverse drug reactions, diagnosis change, or new procedure performed?: [x] No    [] Yes (see summary sheet for update) Subjective functional status/changes:   [] No changes reported Pt has no new complaints at this time. OBJECTIVE 20 min Therapeutic Exercise:  [x] See flow sheet :  
Rationale: increase ROM and increase strength to improve the patients ability to increase ease with ADLs 30 min Neuromuscular Re-education:  [x]  See flow sheet :dynamic balance and gait exercises Rationale: increase strength, improve coordination and increase proprioception  to improve the patients ability to improve ease with community mobility With 
 [] TE 
 [] TA 
 [] neuro 
 [] other: Patient Education: [x] Review HEP [] Progressed/Changed HEP based on:  
[] positioning   [] body mechanics   [] transfers   [] heat/ice application   
[] other:   
 
  
 
Pain Level (0-10 scale) post treatment: 0 
 
ASSESSMENT/Changes in Function: Pt is progressing well towards long term goals. Pt is able to ambulate unlimited distances with use of AD and is able to ambulate within the house and for short distances outdoors without AD with SBA. Pt reports an overall 90% improvement since [facility] and demonstrates with improved strength of left LE and increased balance. Pt is being D/C at this time due increased (I) with all household and communtiy ADLs. Patient will continue to benefit from skilled PT services to modify and progress therapeutic interventions, address functional mobility deficits, address ROM deficits, address strength deficits, analyze and address soft tissue restrictions, analyze and cue movement patterns, analyze and modify body mechanics/ergonomics, assess and modify postural abnormalities, address imbalance/dizziness and instruct in home and community integration to attain remaining goals. [x]  See Plan of Care 
[]  See progress note/recertification 
[]  See Discharge Summary Progress towards goals / Updated goals: 
Short term goals to be achieved in 1 week: 
1) Pt will report full compliance with HEP in order to progress in therapy. At eval: handed out HEP Current: Met: reports full compliance with HEP. 2/19/19 
  
Long term goals to be achieved in 8 weeks 1) Pt will have an improved tinetti score to >/= 24 indicating a low risk for falls to increase ease with outdoor ambulation At PN[de-identified] Progressing: tinettie= 21 4/9/10 Current Remains: remains 21 5/10/19 2) Pt will have an increase in the five time sit to stand test to >/=15 seconds without use of hands to increase ease with household ADLs. .  
At PN: Progressing five time sit to stands without use of hands 20 seconds 4/30/19 Current: MET, five time sit to stand=15 seconds without use of hands 5/10/19 3) Pt will demonstrate an increase in SLS to >/=10 seconds on left to increase ease with stair management. PN: Progressing: left SLS= 23 seconds 4/30/19 Current: MET, B SLS=30 seconds 5/10/19 4) Pt will have an improved FOTO to >/= 53 to increase ease with function PN: FOTO= 49 4/9/10   
Current: MET: FOTO= 59 5/8/19 
5) Pt will report an increase in function of >/=70% to increase ease with playing with children At eval: 0% At [facility], pt reports 90% [facility] 4/23/19 PLAN [x]  Upgrade activities as tolerated     [x]  Continue plan of care []  Update interventions per flow sheet      
[]  Discharge due to:_ 
[]  Other:_ Ursula Sawyer 5/10/2019  7:25 AM 
 
Future Appointments Date Time Provider Daron Rojas 5/10/2019  7:30 AM [facility]

## 2019-05-10 NOTE — PROGRESS NOTES
[facility] [address] [facility] [place] [place]  
[phone] fax Discharge Summary Patient name: Brian Beckford Start of Care: 2019 Referral source: Lorene MathurDO : 1980 Medical/Treatment Diagnosis: Pain in right knee [M25.561] Bilateral leg weakness [R29.898] Payor: BLUE CROSS / Plan: [facility] PPO / Product Type: PPO /  Onset Date:CVA 3 years ago, S/P of right knee: 8 months ago Prior Hospitalization: see medical history Provider#: [id number] Medications: Verified on Patient Summary List   
Comorbidities: CVA 3 years ago, S/P of right knee: 8 months ago Prior Level of Function::difficulty with prolonged walking and standing, trouble performing household and community ADLs Visits from Start of Care: 22    Missed Visits: 0 Reporting Period : 2019 to 5/10/2019 Summary of Care: 
Short term goals to be achieved in 1 week: 
1) Pt will report full compliance with HEP in order to progress in therapy. At eval: handed out HEP Current: Met: reports full compliance with HEP.  
  
Long term goals to be achieved in 8 weeks 1) Pt will have an improved tinetti score to >/= 24 indicating a low risk for falls to increase ease with outdoor ambulation At PN[de-identified] Progressing: tinettie= 21 Current Remains: remains 21 5 
2) Pt will have an increase in the five time sit to stand test to >/=15 seconds without use of hands to increase ease with household ADLs. .  
At PN: Progressing five time sit to stands without use of hands 20 seconds Current: MET, five time sit to stand=15 seconds without use of hands 3) Pt will demonstrate an increase in SLS to >/=10 seconds on left to increase ease with stair management. PN: Progressing: left SLS= 23 seconds Current: MET, B SLS=30 seconds 4) Pt will have an improved FOTO to >/= 53 to increase ease with function PN: FOTO= 49    
 Current: MET: FOTO= 59  
5) Pt will report an increase in function of >/=70% to increase ease with playing with children At eval: 0% At [facility], pt reports 90% SOC  
  
  
 
ASSESSMENT/RECOMMENDATIONS:Pt is progressing well towards long term goals. Pt is able to ambulate unlimited distances with use of AD and is able to ambulate within the house and for short distances outdoors without AD with SBA. Pt reports an overall 90% improvement since [facility] and demonstrates with improved strength of left LE and increased balance. Pt is being D/C at this time due increased (I) with all household and communtiy ADLs. [x]Discontinue therapy: [x]Patient has reached or is progressing toward set goals []Patient is non-compliant or has abdicated 
    []Due to lack of appreciable progress towards set goals eDlta Ashraf 5/10/2019 8:09 AM

## 2020-02-21 ENCOUNTER — APPOINTMENT (OUTPATIENT)
Dept: PHYSICAL THERAPY | Age: 40
End: 2020-02-21
Payer: COMMERCIAL

## 2020-02-24 ENCOUNTER — HOSPITAL ENCOUNTER (OUTPATIENT)
Dept: PHYSICAL THERAPY | Age: 40
Discharge: HOME | End: 2020-02-24
Payer: COMMERCIAL

## 2020-02-24 PROCEDURE — 97110 THERAPEUTIC EXERCISES: CPT

## 2020-02-24 PROCEDURE — 97162 PT EVAL MOD COMPLEX 30 MIN: CPT

## 2020-02-24 PROCEDURE — 97530 THERAPEUTIC ACTIVITIES: CPT

## 2020-02-24 NOTE — PROGRESS NOTES
PT DAILY TREATMENT NOTE 10-18    Patient Name: Ladonna Patient  Date:2020  : 1980  [x]  Patient  Verified  Payor: BLUE CROSS / Plan: Iván Todd [id number] PPO / Product Type: PPO /    In time:900  Out time:950  Total Treatment Time (min): 50  Visit #: 1 of 12    Medicare/BCBS Only   Total Timed Codes (min):  23 1:1 Treatment Time:  50       Treatment Area: Other abnormalities of gait and mobility [R26.89]  Weakness of left lower extremity [R29.898]    Physical Therapy Evaluation  Neurologic    SUBJECTIVE      Any medication changes, allergies to medications, adverse drug reactions, diagnosis change, or new procedure performed?: [x] No    [] Yes (see summary sheet for update)    Subjective functional status/changes:     PLOF: Father (10 yo, 1 yo), Fall History, Ambulation with SPC, Ambulation for Exercise  Current Functional Status: Difficulty with prolonged ambulation, Difficulty with functional lifting  Work 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 At this time patient reports subjectively 90% return of strength of the left UE/LE with greater functional limitations secondary to left LE weakness. Patient reports requirement to use [facility] with all ambulation with inability to ambulate without AD. Patient as well with history of right knee arthroscopic surgery 2018. Patient reports episodes of left LE muscular spasm.  Patient denies use of left LE bracing without use of AFO and knee bracing. Patient reports entire left LE N/T with prolonged sitting with patient denying N/T in left LE. Patient reports secondary to symptoms difficulty with household and functional ADLs. Patient with receival of PT services winter/spring 2019 with positive benefit. Patient reports falls occur when playing with children. Pain Intensity (0-10, VAS): Current 2, Worst 8, Best 0    Patient Goals: \"Be able to walk without the cane\"    OBJECTIVE EXAMINATION    BP: 112/82 mmHg     Gait: Without SPC; Right knee hyperextension during stance phase of gait with poor hip flexion during swing phase    Functional Tests:  1. TUG (10 feet, SPC): 16 sec  2. 30-Second-Sit-to-Stand (chair): 2 repetitions (min-Assist/CGA, no UE assist)  3.  Rhomberg: EO 30 sec / EC 30 sec    Shoulder Screen: AROM WNL and symmetrical; Strength WNL and symmetrical    Left LE ROM Screen: Hip/knee/ankle PROM WNL, (-) Modified Yann Test    Observation: Symmetrical muscle girth of thigh and lower leg    Strength (MMT):                      Hip L (1-5) R (1-5)   Hip Flexion 1 5   Hip Ext 1    Hip ER NT 5   Hip IR NT 5     Knee L (1-5) R (1-5)   Knee Flexion 1 5   Knee Extension 1 5   Ankle DF 2- 5     Tone: Normal tone    Reflexes: [] Not Tested   Left Right   Knee Jerk (L4) 2+ 2+   Ankle Jerk (SI) 0+ 2+     Balance/ Equilibrium:       Sitting Balance: Static:  [x] Good    [] Fair    [] Poor     Dynamic:   [x] Good    [] Fair    [] Poor    OBJECTIVE    27 min [x]Eval                  []Re-Eval     15 min Therapeutic Exercise:  [x] See flow sheet : Patient educated regarding completion of prescribed HEP and provided with written HEP instructions, Patient educated regarding diagnosis and PT POC   Rationale: increase ROM and increase strength to improve the patients ability to improve ease with functional ADLs    8 min Therapeutic Activity:  [x]  See flow sheet : Review of safety with reduce falls risk   Rationale: increase ROM, increase strength and increase proprioception  to improve the patients ability to decrease falls risk           With   [] TE   [] TA   [] neuro   [] other: Patient Education: [x] Review HEP    [] Progressed/Changed HEP based on:   [] positioning   [] body mechanics   [] transfers   [] heat/ice application    [] other:      Other Objective/Functional Measures: See objective above. Pain Level (0-10 scale) post treatment: 2    ASSESSMENT/Changes in Function: Patient presents with gait instability with PMH significant for stroke (~2015) with resultant left-sided weakness with requirement to utilize [facility] with ambulation. Patient noted with trace muscle contractions of the left LE with a compensatory gait pattern with SPC with left knee hyperextension noted and poor limb progression with ambulation without SPC. To promote improvement in left LE muscular recruitment and gait sequencing to improve static and dynamic stability and reduce falls risk with patient with goal to ambulate without AD. Patient will continue to benefit from skilled PT services to address strength deficits, analyze and address soft tissue restrictions, analyze and cue movement patterns, analyze and modify body mechanics/ergonomics, assess and modify postural abnormalities, address imbalance/dizziness and instruct in home and community integration to attain remaining goals. [x]  See Plan of Care  []  See progress note/recertification  []  See Discharge Summary         Progress towards goals / Updated goals:    Short Term Goals: To be accomplished in 3 weeks:  1. Patient will subjectively report full compliance with prescribed HEP. Eval: HEP provided  2. Patient will demonstrate supine left hip flexion with stability ball 0-90 degrees to improve ease with curb management. Eval: Supine Hip Flexion with Stability = Inability to perform without manual assistanceb  3.  Patient will demonstrate ability to perform rhomberg (EO, airex) >/= 30 seconds to improve ease with . Eval: Rhomberg (EO, no-Airex) = 30 sec    Long Term Goals: To be accomplished in 6 weeks:  1. Patient will demonstrate a significant functional improvement as demonstrated by a score of >/= 61 on FOTO. Eval: FOTO = 44       2. Patient will demonstrate TUG (10 feet, no AD) </= 20 seconds to demonstrate a reduced falls risk. Eval: TUG (10 feet, SPC = 16 sec  3. Patient will demonstrate 30-second-sit-to-stand >/= 5 repetitions (no UE assist, CGA) to improve ease with functional transfers.   Eval: 30-Second-Sit-to-Stand (chair): 2 repetitions (min-Assist/CGA, no UE assist)      PLAN  [x]  Upgrade activities as tolerated     []  Continue plan of care  []  Update interventions per flow sheet       []  Discharge due to:_  []  Other:_      Naila Osei PT 2/24/2020  6:49 AM    Future Appointments   Date Time Provider Daron Rojas   2/24/2020  9:00 AM Nhi Arguello, PT [facility]

## 2020-02-24 NOTE — PROGRESS NOTES
[facility]              [address]        [place], [address]  [phone] fax    Plan of Care/ Statement of Necessity for Physical Therapy Services  Patient name: Ricki Swartz Start of Care: 2020   Referral source: Quintin Patel MD : 1980    Medical Diagnosis: Other abnormalities of gait and mobility [R26.89]  Weakness of left lower extremity [R29.898]  Payor: BLUE CROSS / Plan: Peterinta DANA Todd [id number] PPO / Product Type: PPO /  Onset Date:~5 years prior to IE    Treatment Diagnosis: Gait Instability    Prior Hospitalization: see medical history Provider#: [id number]   Medications: Verified on Patient summary List    Comorbidities: Stroke (~5 years prior to IE - left UE/LE involvement), Right Knee Arthroscopic Surgery (2018 - Meniscus Repair, Cyst Removal), Right Wrist Surgery (Scapho-lunate dissociation - ~10 years ago; Hardware removal), HA, Back Pain   Prior Level of Function: Father (10 yo, 1 yo), Fall History, Ambulation with SPC, Ambulation for Exercise     The Plan of Care and following information is based on the information from the initial evaluation. Assessment:    Patient presents with gait instability with PMH significant for stroke (~) with resultant left-sided weakness with requirement to utilize [facility] with ambulation. Patient noted with trace muscle contractions of the left LE with a compensatory gait pattern with SPC with left knee hyperextension noted and poor limb progression with ambulation without SPC.  To promote improvement in left LE muscular recruitment and gait sequencing to improve static and dynamic stability and reduce falls risk with patient with goal to ambulate without AD.      Patient will continue to benefit from skilled PT services to address strength deficits, analyze and address soft tissue restrictions, analyze and cue movement patterns, analyze and modify body mechanics/ergonomics, assess and modify postural abnormalities, address imbalance/dizziness and instruct in home and community integration to attain remaining goals. Key Information:    Gait: Without SPC; Right knee hyperextension during stance phase of gait with poor hip flexion during swing phase     Functional Tests:  1. TUG (10 feet, SPC): 16 sec  2. 30-Second-Sit-to-Stand (chair): 2 repetitions (min-Assist/CGA, no UE assist)  3. Rhomberg: EO 30 sec / EC 30 sec      Left LE ROM Screen: Hip/knee/ankle PROM WNL, (-) Modified Yann Test    Strength (MMT):                      Hip L (1-5) R (1-5)   Hip Flexion 1 5   Hip Ext NT NT    Hip ER NT 5   Hip IR NT 5      Knee L (1-5) R (1-5)   Knee Flexion 1 5   Knee Extension 1 5   Ankle DF 2- 5     Evaluation Complexity History MEDIUM  Complexity : 1-2 comorbidities / personal factors will impact the outcome/ POC ; Examination MEDIUM Complexity : 3 Standardized tests and measures addressing body structure, function, activity limitation and / or participation in recreation  ;Presentation MEDIUM Complexity : Evolving with changing characteristics  ; Clinical Decision Making MEDIUM Complexity : FOTO score of 26-74  Overall Complexity Rating: MEDIUM  Problem List: pain affecting function, decrease ROM, decrease strength, impaired gait/ balance, decrease ADL/ functional abilitiies, decrease activity tolerance, decrease flexibility/ joint mobility and decrease transfer abilities   Treatment Plan may include any combination of the following: Therapeutic exercise, Therapeutic activities, Neuromuscular re-education, Physical agent/modality, Gait/balance training, Manual therapy, Patient education, Self Care training, Functional mobility training, Home safety training and Stair training  Patient / Family readiness to learn indicated by: asking questions, trying to perform skills and interest  Persons(s) to be included in education: patient (P)  Barriers to Learning/Limitations: None  Patient Goal (s): Be able to walk without the cane\"  Patient Self Reported Health Status: good  Rehabilitation Potential: good    Short Term Goals: To be accomplished in 3 weeks:  1. Patient will subjectively report full compliance with prescribed HEP. Eval: HEP provided  2. Patient will demonstrate supine left hip flexion with stability ball 0-90 degrees to improve ease with curb management. Eval: Supine Hip Flexion with Stability = Inability to perform without manual assistanceb  3. Patient will demonstrate ability to perform rhomberg (EO, airex) >/= 30 seconds to improve ease with . Eval: Rhomberg (EO, no-Airex) = 30 sec    Long Term Goals: To be accomplished in 6 weeks:  1. Patient will demonstrate a significant functional improvement as demonstrated by a score of >/= 61 on FOTO. Eval: FOTO = 44       2. Patient will demonstrate TUG (10 feet, no AD) </= 20 seconds to demonstrate a reduced falls risk. Eval: TUG (10 feet, SPC = 16 sec  3. Patient will demonstrate 30-second-sit-to-stand >/= 5 repetitions (no UE assist, CGA) to improve ease with functional transfers. Eval: 30-Second-Sit-to-Stand (chair): 2 repetitions (min-Assist/CGA, no UE assist)    Frequency / Duration: Patient to be seen 2 times per week for 6 weeks. Patient/ Caregiver education and instruction: Diagnosis, prognosis, self care, activity modification and exercises   [x]  Plan of care has been reviewed with PTA    Sony Kidd, PT 2/24/2020 6:52 AM  ________________________________________________________________________    I certify that the above Therapy Services are being furnished while the patient is under my care. I agree with the treatment plan and certify that this therapy is necessary.     Physician's Signature:____________Date:_________TIME:________    [employer], Date and Time must be completed for valid certification **  Please sign and return to [facility] [address] [facility] [address], [address]   [phone] fax

## 2020-03-03 ENCOUNTER — HOSPITAL ENCOUNTER (OUTPATIENT)
Dept: PHYSICAL THERAPY | Age: 40
Discharge: HOME | End: 2020-03-03
Payer: COMMERCIAL

## 2020-03-03 PROCEDURE — 97110 THERAPEUTIC EXERCISES: CPT

## 2020-03-03 PROCEDURE — 97112 NEUROMUSCULAR REEDUCATION: CPT

## 2020-03-03 NOTE — PROGRESS NOTES
PT DAILY TREATMENT NOTE 10-18    Patient Name: Karis Olea  Date:3/3/2020  : 1980  [x]  Patient  Verified  Payor: Shanna Alfaro / Plan: Iván DANA Todd [id number] PPO / Product Type: PPO /    In time:834  Out time:917  Total Treatment Time (min): 43  Visit #: 2 of 12    Medicare/BCBS Only   Total Timed Codes (min):  43 1:1 Treatment Time:  38       Treatment Area: Other abnormalities of gait and mobility [R26.89]  Weakness of left lower extremity [R29.898]    SUBJECTIVE  Pain Level (0-10 scale): 2  Any medication changes, allergies to medications, adverse drug reactions, diagnosis change, or new procedure performed?: [x] No    [] Yes (see summary sheet for update)  Subjective functional status/changes:   [] No changes reported  Patient reports initiation without full compliance with prescribed HEP. Patient reports having missed his last appointment secondary to sickness. OBJECTIVE    15 min Therapeutic Exercise:  [x] See flow sheet : Emphasis placed on improving LE strength   Rationale: increase ROM and increase strength to improve the patients ability to improve ease with stair management. 28 min Neuromuscular Re-education:  [x]  See flow sheet : Emphasis placed on improving activation and recruitment of the LE musculature and improving LE proprioceptive and kinesthetic awareness   Rationale: increase ROM, increase strength, improve balance and increase proprioception  to improve the patients ability to improve ease with . With   [] TE   [] TA   [] neuro   [] other: Patient Education: [x] Review HEP    [] Progressed/Changed HEP based on:   [] positioning   [] body mechanics   [] transfers   [] heat/ice application    [] other:      Other Objective/Functional Measures:  Moderate assistance required with knee flexion with forward step tap     Pain Level (0-10 scale) post treatment: 0    ASSESSMENT/Changes in Function: With emphasis placed on dissociation of movement of the left lower extremity to improve gait mechanics without compensation with continued hip hike with swing phase of gait noted. Patient will continue to benefit from skilled PT services to modify and progress therapeutic interventions, address functional mobility deficits, address ROM deficits, address strength deficits, analyze and address soft tissue restrictions, analyze and cue movement patterns, analyze and modify body mechanics/ergonomics, assess and modify postural abnormalities and address imbalance/dizziness to attain remaining goals. []  See Plan of Care  []  See progress note/recertification  []  See Discharge Summary         Progress towards goals / Updated goals:    Short Term Goals: To be accomplished in 3 weeks:  1. Patient will subjectively report full compliance with prescribed HEP. Eval: HEP provided  Current: Progressing, HEP initiated without full compliance, 3/3/2020  2. Patient will demonstrate supine left hip flexion with stability ball 0-90 degrees to improve ease with curb management. Eval: Supine Hip Flexion with Stability Ball = Inability to perform without manual assistance  3. Patient will demonstrate ability to perform rhomberg (EO, airex) >/= 30 seconds to improve ease with . Eval: Rhomberg (EO, no-Airex) = 30 sec     Long Term Goals: To be accomplished in 6 weeks:  1. Patient will demonstrate a significant functional improvement as demonstrated by a score of >/= 61 on FOTO. Eval: FOTO = 44       2. Patient will demonstrate TUG (10 feet, no AD) </= 20 seconds to demonstrate a reduced falls risk. Eval: TUG (10 feet, SPC = 16 sec  3. Patient will demonstrate 30-second-sit-to-stand >/= 5 repetitions (no UE assist, CGA) to improve ease with functional transfers.   Eval: 30-Second-Sit-to-Stand (chair): 2 repetitions (min-Assist/CGA, no UE assist)       PLAN  [x]  Upgrade activities as tolerated     [x]  Continue plan of care  []  Update interventions per flow sheet       [] Discharge due to:_  []  Other:_      Sony Kidd, PT 3/3/2020  7:49 AM    Future Appointments   Date Time Provider Daron Rojas   3/3/2020  8:30 AM Zoila Duarte, PT [facility] [address]   [facility]  [facility] [address]   3/10/2020  8:30 AM Zoila Duarte, PT [facility] [address]   3/13/2020  8:30 AM Zoila Duarte, PT [facility] [address]

## 2020-03-06 ENCOUNTER — HOSPITAL ENCOUNTER (OUTPATIENT)
Dept: PHYSICAL THERAPY | Age: 40
Discharge: HOME | End: 2020-03-06
Payer: COMMERCIAL

## 2020-03-06 PROCEDURE — 97112 NEUROMUSCULAR REEDUCATION: CPT

## 2020-03-06 PROCEDURE — 97110 THERAPEUTIC EXERCISES: CPT

## 2020-03-06 NOTE — PROGRESS NOTES
PT DAILY TREATMENT NOTE 10-18    Patient Name: Jak Velázquez  Date:3/6/2020  : 1980  [x]  Patient  Verified  Payor: Sarahi Tang / Plan: Iván Todd [id number] PPO / Product Type: PPO /    In time:8:32  Out time:9:25  Total Treatment Time (min): 53  Visit #: 3 of 12    Medicare/BCBS Only   Total Timed Codes (min):  53 1:1 Treatment Time:  53       Treatment Area: Other abnormalities of gait and mobility [R26.89]  Weakness of left lower extremity [R29.898]    SUBJECTIVE  Pain Level (0-10 scale): 0  Any medication changes, allergies to medications, adverse drug reactions, diagnosis change, or new procedure performed?: [x] No    [] Yes (see summary sheet for update)  Subjective functional status/changes:   [] No changes reported  Pt reports that he woke up this morning and state he couldn't walk because he did a lot of lifting and standing yesterday. Pt reports after an hour of being up he was able to walk again.      OBJECTIVE        Min Type Additional Details    [] Estim:  []Unatt       []IFC  []Premod                        []Other:  []w/ice   []w/heat  Position:  Location:    [] Estim: []Att    []TENS instruct  []NMES                    []Other:  []w/US   []w/ice   []w/heat  Position:  Location:    []  Traction: [] Cervical       []Lumbar                       [] Prone          []Supine                       []Intermittent   []Continuous Lbs:  [] before manual  [] after manual    []  Ultrasound: []Continuous   [] Pulsed                           []1MHz   []3MHz W/cm2:  Location:    []  Iontophoresis with dexamethasone         Location: [] Take home patch   [] In clinic    []  Ice     []  heat  []  Ice massage  []  Laser   []  Anodyne Position:  Location:    []  Laser with stim  []  Other:  Position:  Location:    []  Vasopneumatic Device Pressure:       [] lo [] med [] hi   Temperature: [] lo [] med [] hi   [] Skin assessment post-treatment:  []intact []redness- no adverse reaction    []redness  adverse reaction:        25 min Therapeutic Exercise:  [x]? See flow sheet : Emphasis placed on improving LE strength   Rationale: increase ROM and increase strength to improve the patients ability to improve ease with stair management.     28 min Neuromuscular Re-education:  [x]? See flow sheet : Emphasis placed on improving activation and recruitment of the LE musculature and improving LE proprioceptive and kinesthetic awareness   Rationale: increase ROM, increase strength, improve balance and increase proprioception  to improve the patients ability to improve ease with . With   [] TE   [] TA   [] neuro   [] other: Patient Education: [x] Review HEP    [] Progressed/Changed HEP based on:   [] positioning   [] body mechanics   [] transfers   [] heat/ice application    [] other:      Other Objective/Functional Measures: Pt requires CGA with sit to stand. Pain Level (0-10 scale) post treatment: 0    ASSESSMENT/Changes in Function: Pt continues to require assist with performing standing heel taps. Pt ambulates with left knee hyperextension without AD. Patient will continue to benefit from skilled PT services to modify and progress therapeutic interventions, address functional mobility deficits, address ROM deficits, address strength deficits and analyze and address soft tissue restrictions to attain remaining goals. []  See Plan of Care  []  See progress note/recertification  []  See Discharge Summary         Progress towards goals / Updated goals:     Short Term Goals: To be accomplished in 3 weeks:  1. Patient will subjectively report full compliance with prescribed HEP. Eval: HEP provided  Current: Progressing, HEP initiated without full compliance, 3/3/2020  2. Patient will demonstrate supine left hip flexion with stability ball 0-90 degrees to improve ease with curb management.   Eval: Supine Hip Flexion with Stability Ball = Inability to perform without manual assistance  3. Patient will demonstrate ability to perform rhomberg (EO, airex) >/= 30 seconds to improve ease with . Eval: Rhomberg (EO, no-Airex) = 30 sec     Long Term Goals: To be accomplished in 6 weeks:  1. Patient will demonstrate a significant functional improvement as demonstrated by a score of >/= 61 on FOTO. Eval: FOTO = 44       2. Patient will demonstrate TUG (10 feet, no AD) </= 20 seconds to demonstrate a reduced falls risk. Eval: TUG (10 feet, SPC = 16 sec  3. Patient will demonstrate 30-second-sit-to-stand >/= 5 repetitions (no UE assist, CGA) to improve ease with functional transfers.   Eval: 30-Second-Sit-to-Stand (chair): 2 repetitions (min-Assist/CGA, no UE assist)       PLAN  []  Upgrade activities as tolerated     [x]  Continue plan of care  []  Update interventions per flow sheet       []  Discharge due to:_  []  Other:_      Edie Villanueva PTA 3/6/2020  8:34 AM    Future Appointments   Date Time Provider Daron Rojas   3/10/2020  8:30 AM Dennys Carrasco PT [facility]   3/13/2020  8:30 AM Dennys Carrasco PT [facility]

## 2020-03-10 ENCOUNTER — HOSPITAL ENCOUNTER (OUTPATIENT)
Dept: PHYSICAL THERAPY | Age: 40
Discharge: HOME | End: 2020-03-10
Payer: COMMERCIAL

## 2020-03-10 PROCEDURE — 97110 THERAPEUTIC EXERCISES: CPT

## 2020-03-10 PROCEDURE — 97112 NEUROMUSCULAR REEDUCATION: CPT

## 2020-03-10 NOTE — PROGRESS NOTES
PT DAILY TREATMENT NOTE 10-18    Patient Name: Nara Orozco  Date:3/10/2020  : 1980  [x]  Patient  Verified  Payor: Danay Muniz / Plan: Iván Todd [id number] PPO / Product Type: PPO /    In time:830  Out time:911  Total Treatment Time (min): 41  Visit #: 4 of 12    Medicare/BCBS Only   Total Timed Codes (min):  41 1:1 Treatment Time:  25       Treatment Area: Other abnormalities of gait and mobility [R26.89]  Weakness of left lower extremity [R29.898]    SUBJECTIVE  Pain Level (0-10 scale): 0  Any medication changes, allergies to medications, adverse drug reactions, diagnosis change, or new procedure performed?: [x] No    [] Yes (see summary sheet for update)  Subjective functional status/changes:   [] No changes reported   Patient reports self-perceived perception of improved isolated movement of the ankle since starting therapy. OBJECTIVE    16 min Therapeutic Exercise:  [x]? See flow sheet : Emphasis placed on improving LE strength   Rationale: increase ROM and increase strength to improve the patients ability to improve ease with stair management.     25 min Neuromuscular Re-education:  [x]? See flow sheet : Emphasis placed on improving activation and recruitment of the LE musculature and improving LE proprioceptive and kinesthetic awareness   Rationale: increase ROM, increase strength, improve balance and increase proprioception  to improve the patients ability to improve ease with .         With   [] TE   [] TA   [] neuro   [] other: Patient Education: [x] Review HEP    [] Progressed/Changed HEP based on:   [] positioning   [] body mechanics   [] transfers   [] heat/ice application    [] other:      Other Objective/Functional Measures:   Rhomberg (EO, Airex) = 30 sec     Pain Level (0-10 scale) post treatment: 0    ASSESSMENT/Changes in Function: Observationally improved ability to independently unlock his knee in weightbearing with control but continues to require handheld assistance with balance. Patient will continue to benefit from skilled PT services to modify and progress therapeutic interventions, address functional mobility deficits, address ROM deficits, address strength deficits, analyze and address soft tissue restrictions, analyze and cue movement patterns, analyze and modify body mechanics/ergonomics and assess and modify postural abnormalities to attain remaining goals. []  See Plan of Care  []  See progress note/recertification  []  See Discharge Summary         Progress towards goals / Updated goals:       Short Term Goals: To be accomplished in 3 weeks:  1. Patient will subjectively report full compliance with prescribed HEP. Eval: HEP provided  Current: Progressing, HEP initiated without full compliance, 3/3/2020  2. Patient will demonstrate supine left hip flexion with stability ball 0-90 degrees to improve ease with curb management. Eval: Supine Hip Flexion with Stability Ball = Inability to perform without manual assistance  3. Patient will demonstrate ability to perform rhomberg (EO, airex) >/= 30 seconds to improve ease with . Eval: Rhomberg (EO, no-Airex) = 30 sec  Current: Met, Rhomberg (EO, Airex) = 30 sec, 3/10/2020     Long Term Goals: To be accomplished in 6 weeks:  1. Patient will demonstrate a significant functional improvement as demonstrated by a score of >/= 61 on FOTO. Eval: FOTO = 44       2. Patient will demonstrate TUG (10 feet, no AD) </= 20 seconds to demonstrate a reduced falls risk. Eval: TUG (10 feet, SPC = 16 sec  3. Patient will demonstrate 30-second-sit-to-stand >/= 5 repetitions (no UE assist, CGA) to improve ease with functional transfers.   Eval: 30-Second-Sit-to-Stand (chair): 2 repetitions (min-Assist/CGA, no UE assist)    PLAN  [x]  Upgrade activities as tolerated     [x]  Continue plan of care  []  Update interventions per flow sheet       []  Discharge due to:_  []  Other:_      Kathy Lopez PT 3/10/2020  7:48 AM    Future Appointments   Date Time Provider Daron Rojas   3/10/2020  8:30 AM Júnior, [address] [facility]   3/13/2020  8:30 AM Lupe Kumar, PT [facility]

## 2020-03-13 ENCOUNTER — APPOINTMENT (OUTPATIENT)
Dept: PHYSICAL THERAPY | Age: 40
End: 2020-03-13
Payer: COMMERCIAL

## 2020-03-18 ENCOUNTER — HOSPITAL ENCOUNTER (OUTPATIENT)
Dept: PHYSICAL THERAPY | Age: 40
Discharge: HOME | End: 2020-03-18
Payer: COMMERCIAL

## 2020-03-18 PROCEDURE — 97110 THERAPEUTIC EXERCISES: CPT

## 2020-03-18 PROCEDURE — 97112 NEUROMUSCULAR REEDUCATION: CPT

## 2020-03-18 NOTE — PROGRESS NOTES
PT DAILY TREATMENT NOTE 10-18    Patient Name: Laura Neely  Date:3/18/2020  : 1980  [x]  Patient  Verified  Payor: BLUE CROSS / Plan: Iván Todd [id number] PPO / Product Type: PPO /    In time:8:30  Out time:9:16  Total Treatment Time (min): 46  Visit #: 5 of 12    Medicare/BCBS Only   Total Timed Codes (min):  46 1:1 Treatment Time:  38       Treatment Area: Other abnormalities of gait and mobility [R26.89]  Weakness of left lower extremity [R29.898]    SUBJECTIVE  Pain Level (0-10 scale): 0  Any medication changes, allergies to medications, adverse drug reactions, diagnosis change, or new procedure performed?: [x] No    [] Yes (see summary sheet for update)  Subjective functional status/changes:   [] No changes reported  Pt reports he has noticed he is stronger. OBJECTIVE        Min Type Additional Details    [] Estim:  []Unatt       []IFC  []Premod                        []Other:  []w/ice   []w/heat  Position:  Location:    [] Estim: []Att    []TENS instruct  []NMES                    []Other:  []w/US   []w/ice   []w/heat  Position:  Location:    []  Traction: [] Cervical       []Lumbar                       [] Prone          []Supine                       []Intermittent   []Continuous Lbs:  [] before manual  [] after manual    []  Ultrasound: []Continuous   [] Pulsed                           []1MHz   []3MHz W/cm2:  Location:    []  Iontophoresis with dexamethasone         Location: [] Take home patch   [] In clinic    []  Ice     []  heat  []  Ice massage  []  Laser   []  Anodyne Position:  Location:    []  Laser with stim  []  Other:  Position:  Location:    []  Vasopneumatic Device Pressure:       [] lo [] med [] hi   Temperature: [] lo [] med [] hi   [] Skin assessment post-treatment:  []intact []redness- no adverse reaction    []redness  adverse reaction:        23 min Therapeutic Exercise:  [x]? ? See flow sheet : Emphasis placed on improving LE strength   Rationale: increase ROM and increase strength to improve the patients ability to improve ease with stair management.     23 min Neuromuscular Re-education:  [x]? ?  See flow sheet : Emphasis placed on improving activation and recruitment of the LE musculature and improving LE proprioceptive and kinesthetic awareness   Rationale: increase ROM, increase strength, improve balance and increase proprioception  to improve the patients ability to improve ease with . With   [] TE   [] TA   [] neuro   [] other: Patient Education: [x] Review HEP    [] Progressed/Changed HEP based on:   [] positioning   [] body mechanics   [] transfers   [] heat/ice application    [] other:      Other Objective/Functional Measures: Supine Hip Flexion with Stability Ball = 40-55 deg without assistance of UE. Pain Level (0-10 scale) post treatment: 0    ASSESSMENT/Changes in Function: Pt has able to perform slight movement of supine hip flexion without assistance. Patient will continue to benefit from skilled PT services to modify and progress therapeutic interventions, address functional mobility deficits, address ROM deficits, address strength deficits and analyze and address soft tissue restrictions to attain remaining goals. []  See Plan of Care  []  See progress note/recertification  []  See Discharge Summary         Progress towards goals / Updated goals:  Short Term Goals: To be accomplished in 3 weeks:  1. Patient will subjectively report full compliance with prescribed HEP. Eval: HEP provided  Current: goal met:  HEP completed daily. 3/18/20  2. Patient will demonstrate supine left hip flexion with stability ball 0-90 degrees to improve ease with curb management. Eval: Supine Hip Flexion with Stability Ball = Inability to perform without manual assistance  Current: Progressing: Supine Hip Flexion with Stability Ball = 40-55 deg without assistance of UE. 3/18/20  3.  Patient will demonstrate ability to perform rhomberg (EO, airex) >/= 30 seconds to improve ease with . Eval: Rhomberg (EO, no-Airex) = 30 sec  Current: Met, Rhomberg (EO, Airex) = 30 sec, 3/10/2020     Long Term Goals: To be accomplished in 6 weeks:  1. Patient will demonstrate a significant functional improvement as demonstrated by a score of >/= 61 on FOTO. Eval: FOTO = 44       2. Patient will demonstrate TUG (10 feet, no AD) </= 20 seconds to demonstrate a reduced falls risk. Eval: TUG (10 feet, SPC = 16 sec  3. Patient will demonstrate 30-second-sit-to-stand >/= 5 repetitions (no UE assist, CGA) to improve ease with functional transfers.   Eval: 30-Second-Sit-to-Stand (chair): 2 repetitions (min-Assist/CGA, no UE assist)  Current;        PLAN  []  Upgrade activities as tolerated     [x]  Continue plan of care  []  Update interventions per flow sheet       []  Discharge due to:_  []  Other:_      Javier Wilkins PTA 3/18/2020  7:54 AM    Future Appointments   Date Time Provider Daron Rojas   3/18/2020  8:30 AM Antonio Olson, PTA [facility]   3/20/2020  8:30 AM Heena Rachel PT [facility]

## 2020-03-20 ENCOUNTER — HOSPITAL ENCOUNTER (OUTPATIENT)
Dept: PHYSICAL THERAPY | Age: 40
Discharge: HOME | End: 2020-03-20
Payer: COMMERCIAL

## 2020-03-20 PROCEDURE — 97110 THERAPEUTIC EXERCISES: CPT

## 2020-03-20 PROCEDURE — 97112 NEUROMUSCULAR REEDUCATION: CPT

## 2020-03-20 NOTE — PROGRESS NOTES
PT DAILY TREATMENT NOTE 10-18    Patient Name: Denis Page  Date:3/20/2020  : 1980  [x]  Patient  Verified  Payor: Chandler Goel / Plan: [facility] PPO / Product Type: PPO /    In time:837  Out time:919  Total Treatment Time (min): 42  Visit #: 6 of 12    Medicare/BCBS Only   Total Timed Codes (min):  42 1:1 Treatment Time:  23       Treatment Area: Other abnormalities of gait and mobility [R26.89]  Weakness of left lower extremity [R29.898]    SUBJECTIVE  Pain Level (0-10 scale): 0  Any medication changes, allergies to medications, adverse drug reactions, diagnosis change, or new procedure performed?: [x] No    [] Yes (see summary sheet for update)  Subjective functional status/changes:   [] No changes reported  Patient reports desire to be discharged secondary to plan to initiate aquatic rehab. OBJECTIVE     15 min Therapeutic Exercise:  [x]? ? See flow sheet : Emphasis placed on improving LE strength   Rationale: increase ROM and increase strength to improve the patients ability to improve ease with stair management.     27 min Neuromuscular Re-education:  [x]? ?  See flow sheet : Emphasis placed on improving activation and recruitment of the LE musculature and improving LE proprioceptive and kinesthetic awareness   Rationale: increase ROM, increase strength, improve balance and increase proprioception  to improve the patients ability to improve ease with . With   [] TE   [] TA   [] neuro   [] other: Patient Education: [x] Review HEP    [] Progressed/Changed HEP based on:   [] positioning   [] body mechanics   [] transfers   [] heat/ice application    [] other:      Other Objective/Functional Measures: See goals below. Pain Level (0-10 scale) post treatment: 0    ASSESSMENT/Changes in Function:  At this time patient to be discharged in accordance with most recent MD follow up with patient to initiate aquatic therapy to aid in improvement in LE strength and stability. Patient educated to continue with prescribed home exercises as instructed. []  See Plan of Care  []  See progress note/recertification  [x]  See Discharge Summary         Progress towards goals / Updated goals:    Short Term Goals: To be accomplished in 3 weeks:  1. Patient will subjectively report full compliance with prescribed HEP. Eval: HEP provided  Current: goal met:  HEP completed daily. 3/18/20  2. Patient will demonstrate supine left hip flexion with stability ball 0-90 degrees to improve ease with curb management. Eval: Supine Hip Flexion with Stability Ball = Inability to perform without manual assistance  Current: Progressing: Supine Hip Flexion with Stability Ball = 40-55 deg without assistance of UE. 3/18/20  3. Patient will demonstrate ability to perform rhomberg (EO, airex) >/= 30 seconds to improve ease with . Eval: Rhomberg (EO, no-Airex) = 30 sec  Current: Met, Rhomberg (EO, Airex) = 30 sec, 3/10/2020     Long Term Goals: To be accomplished in 6 weeks:  1. Patient will demonstrate a significant functional improvement as demonstrated by a score of >/= 61 on FOTO. Eval: FOTO = 44       Current: Regressing, FOTO = 38, 3/20/2020  2. Patient will demonstrate TUG (10 feet, no AD) </= 20 seconds to demonstrate a reduced falls risk. Eval: TUG (10 feet, SPC) = 16 sec  Current: Progressing, TUG (10 feet, no AD) = 55 sec, 3/20/2020  3. Patient will demonstrate 30-second-sit-to-stand >/= 5 repetitions (no UE assist, CGA) to improve ease with functional transfers.   Eval: 30-Second-Sit-to-Stand (chair): 2 repetitions (min-Assist/CGA, no UE assist)  Current: Progressing, 30-Second-Sit-to-Stand (chair): 4 repetitions (CGA, no UE assist), 3/20/2020    PLAN  []  Upgrade activities as tolerated     []  Continue plan of care  []  Update interventions per flow sheet       []  Discharge due to:_  []  Other:_      Bessy Perezr, PT 3/20/2020  6:49 AM    Future Appointments   Date Time Provider Daron Rojas   3/20/2020  8:30 AM Ramirez Noyola, PT [facility]

## 2020-03-20 NOTE — PROGRESS NOTES
[facility]              [address]        [place], [address] [place]   [phone] fax    Discharge Summary  Patient name: Laura Neely Start of Care: 2020   Referral source: Sixto Hooper MD : 1980   Medical/Treatment Diagnosis: Other abnormalities of gait and mobility [R26.89]  Weakness of left lower extremity [R29.898]  Payor: BLUE CROSS / Plan: Iván Todd [id number] PPO / Product Type: PPO /  Onset Date:~5 years prior to IE     Prior Hospitalization: see medical history Provider#: [id number]   Medications: Verified on Patient Summary List    Comorbidities: Stroke (~5 years prior to IE - left UE/LE involvement), Right Knee Arthroscopic Surgery (2018 - Meniscus Repair, Cyst Removal), Right Wrist Surgery (Scapho-lunate dissociation - ~10 years ago; Hardware removal), HA, Back Pain   Prior Level of Function: Father (12 yo, 1 yo), Fall History, Ambulation with SPC, Ambulation for Exercise  Visits from Start of Care: 6    Missed Visits: 1  Reporting Period : 2020 to 3/20/2020    Summary of Care:    Short Term Goals: To be accomplished in 3 weeks:  1. Patient will subjectively report full compliance with prescribed HEP. Eval: HEP provided  At DC: Met, HEP completed daily  2. Patient will demonstrate supine left hip flexion with stability ball 0-90 degrees to improve ease with curb management. Eval: Supine Hip Flexion with Stability Ball = Inability to perform without manual assistance  At DC: Progressing: Supine Hip Flexion with Stability Ball = 40-55 deg without assistance of UE  3. Patient will demonstrate ability to perform rhomberg (EO, airex) >/= 30 seconds to improve ease with . Eval: Rhomberg (EO, no-Airex) = 30 sec  At DC: Met, Rhomberg (EO, Airex) = 30 sec     Long Term Goals: To be accomplished in 6 weeks:  1.  Patient will demonstrate a significant functional improvement as demonstrated by a score of >/= 61 on FOTO.  Eval: FOTO = 44       At DC: Regressing, FOTO = 38  2. Patient will demonstrate TUG (10 feet, no AD) </= 20 seconds to demonstrate a reduced falls risk. Eval: TUG (10 feet, SPC) = 16 sec  At DC: Progressing, TUG (10 feet, no AD) = 55 se  3. Patient will demonstrate 30-second-sit-to-stand >/= 5 repetitions (no UE assist, CGA) to improve ease with functional transfers. Eval: 30-Second-Sit-to-Stand (chair): 2 repetitions (min-Assist/CGA, no UE assist)  At DC: Progressing, 30-Second-Sit-to-Stand (chair): 4 repetitions (CGA, no UE assist)    ASSESSMENT/RECOMMENDATIONS:    At this time patient to be discharged in accordance with most recent MD follow up with patient to initiate aquatic therapy to aid in improvement in LE strength and stability. Patient educated to continue with prescribed home exercises as instructed.     [x]Discontinue therapy: []Patient has reached or is progressing toward set goals      [x]Patient to initiate aquatic therapy per MD referral      []Due to lack of appreciable progress towards set [address] Emiliano Wu, FLETCHER 3/20/2020 9:07 AM

## 2022-05-01 NOTE — PROGRESS NOTES
PT DAILY TREATMENT NOTE 10-18 Patient Name: Jose Cerrtao Date:3/1/2019 : 1980 [x]  Patient  Verified Payor: BLUE CROSS / Plan: [facility] PPO / Product Type: PPO / In time:900  Out time:1015 Total Treatment Time (min): 75 Visit #: 7 of 24 Medicare/BCBS Only Total Timed Codes (min):  65 1:1 Treatment Time:  45  
 
 
Treatment Area: Pain in right knee [M25.561] Bilateral leg weakness [R29.898] SUBJECTIVE Pain Level (0-10 scale): 0 Any medication changes, allergies to medications, adverse drug reactions, diagnosis change, or new procedure performed?: [x] No    [] Yes (see summary sheet for update) Subjective functional status/changes:   [] No changes reported Pt has no new complaints at this time. OBJECTIVE Modality rationale: decrease pain to improve the patients ability to increase ease with self care tasks Min Type Additional Details  
 [] Estim:  []Unatt       []IFC  []Premod []Other:  []w/ice   []w/heat Position: Location:  
 [] Estim: []Att    []TENS instruct  []NMES []Other:  []w/US   []w/ice   []w/heat Position: Location:  
 []  Traction: [] Cervical       []Lumbar 
                     [] Prone          []Supine []Intermittent   []Continuous Lbs: 
[] before manual 
[] after manual  
 []  Ultrasound: []Continuous   [] Pulsed []1MHz   []3MHz W/cm2: 
Location:  
 []  Iontophoresis with dexamethasone Location: [] Take home patch  
[] In clinic  
10 [x]  Ice     []  heat 
[]  Ice massage 
[]  Laser  
[]  Anodyne Position:supine Location:right knee  
 []  Laser with stim 
[]  Other:  Position: Location:  
 []  Vasopneumatic Device Pressure:       [] lo [] med [] hi  
Temperature: [] lo [] med [] hi  
[] Skin assessment post-treatment:  []intact []redness- no adverse reaction 
  []redness  adverse reaction: 20 min Therapeutic Exercise:  [x] See flow sheet :  
Rationale: increase ROM and increase strength to improve the patients ability to increase ease ADls 45 min Neuromuscular Re-education:  [x]  See flow sheet : dynamic gait exercises, standing balance exercises Rationale: increase strength, improve coordination and increase proprioception  to improve the patients ability to increase ease with community ambulation With 
 [] TE 
 [] TA 
 [] neuro 
 [] other: Patient Education: [x] Review HEP [] Progressed/Changed HEP based on:  
[] positioning   [] body mechanics   [] transfers   [] heat/ice application   
[] other:   
 
  
 
Pain Level (0-10 scale) post treatment: 0 
 
ASSESSMENT/Changes in Function: Pt was able to ambulate 80 feet without AD with CGA requiring min A for turns. Patient will continue to benefit from skilled PT services to modify and progress therapeutic interventions, address functional mobility deficits, address ROM deficits, address strength deficits, analyze and address soft tissue restrictions, analyze and cue movement patterns, analyze and modify body mechanics/ergonomics, assess and modify postural abnormalities, address imbalance/dizziness and instruct in home and community integration to attain remaining goals. [x]  See Plan of Care 
[]  See progress note/recertification 
[]  See Discharge Summary Progress towards goals / Updated goals: 
Short term goals to be achieved in 1 week: 
1) Pt will report full compliance with HEP in order to progress in therapy. At [facility]: handed out HEP Current: Met: reports full compliance with HEP. 2/19/19 
  
Long term goals to be achieved in 8 weeks 1) Pt will have an improved tinetti score to >/= 24 indicating a low risk for falls to increase ease with outdoor ambulation At [facility]: tinetti=13 
2) Pt will have an increase in the five time sit to stand test to >/=15 seconds without use of hands to increase ease with household ADLs. At eval:  Five time sit to stand: 24 seconds, requires cues of hands, poor eccentric quad control. Current: Not met: 5x sit to stand from 18\" chair with B UE use = 32 sec. 2/21/19 3) Pt will demonstrate an increase in SLS to >/=10 seconds on left to increase ease with stair management. At [facility]: able to hold 2 seconds with hand held support  
Current: Progressing: Pt was able to perform alternating marching without HHA. 2/25/18 4) Pt will have an improved FOTO to >/= 53 to increase ease with function At [facility]: FOTO= 42 
5) Pt will report an increase in function of >/=70% to increase ease with playing with children At [facility]: 0% 
  
 
 
 
PLAN [x]  Upgrade activities as tolerated     [x]  Continue plan of care 
[]  Update interventions per flow sheet      
[]  Discharge due to:_ 
[]  Other:_ Daphnie Leon 3/1/2019  9:01 AM 
 
Future Appointments Date Time Provider Daron Rojas 3/5/2019 11:30 AM Merry Castro [facility]  
3/6/2019 10:30 AM Merry Castro [facility]  
3/8/2019  9:30 AM Merry Castro [facility]  
3/11/2019 10:30 AM Merry Castro [facility]  
3/13/2019  1:00 PM Merry Castro [facility]  
3/15/2019  9:30 AM Merry Castro [facility]  
3/18/2019 10:30 AM Merry Castro [facility]  
3/20/2019 10:30 AM Merry Castro [facility]  
3/22/2019  9:00 AM Natasha Ashraf [facility]  
 
 1